# Patient Record
Sex: MALE | Race: BLACK OR AFRICAN AMERICAN | NOT HISPANIC OR LATINO | Employment: FULL TIME | URBAN - METROPOLITAN AREA
[De-identification: names, ages, dates, MRNs, and addresses within clinical notes are randomized per-mention and may not be internally consistent; named-entity substitution may affect disease eponyms.]

---

## 2018-04-05 PROCEDURE — 87070 CULTURE OTHR SPECIMN AEROBIC: CPT | Performed by: FAMILY MEDICINE

## 2018-04-05 PROCEDURE — 87147 CULTURE TYPE IMMUNOLOGIC: CPT | Performed by: FAMILY MEDICINE

## 2018-04-05 PROCEDURE — 87205 SMEAR GRAM STAIN: CPT | Performed by: FAMILY MEDICINE

## 2018-04-09 ENCOUNTER — TELEPHONE (OUTPATIENT)
Dept: URGENT CARE | Facility: CLINIC | Age: 28
End: 2018-04-09

## 2019-06-15 PROBLEM — Z20.2 STD EXPOSURE: Status: ACTIVE | Noted: 2019-06-15

## 2019-06-15 PROCEDURE — 87491 CHLMYD TRACH DNA AMP PROBE: CPT | Performed by: NURSE PRACTITIONER

## 2019-06-15 PROCEDURE — 87591 N.GONORRHOEAE DNA AMP PROB: CPT | Performed by: NURSE PRACTITIONER

## 2019-06-15 PROCEDURE — 87661 TRICHOMONAS VAGINALIS AMPLIF: CPT | Performed by: NURSE PRACTITIONER

## 2019-06-19 ENCOUNTER — TELEPHONE (OUTPATIENT)
Dept: URGENT CARE | Facility: CLINIC | Age: 29
End: 2019-06-19

## 2019-11-13 ENCOUNTER — HOSPITAL ENCOUNTER (EMERGENCY)
Facility: HOSPITAL | Age: 29
Discharge: HOME | End: 2019-11-13
Attending: EMERGENCY MEDICINE | Admitting: EMERGENCY MEDICINE

## 2019-11-13 VITALS
HEART RATE: 63 BPM | OXYGEN SATURATION: 100 % | RESPIRATION RATE: 16 BRPM | WEIGHT: 171.6 LBS | BODY MASS INDEX: 25.42 KG/M2 | SYSTOLIC BLOOD PRESSURE: 125 MMHG | HEIGHT: 69 IN | DIASTOLIC BLOOD PRESSURE: 86 MMHG | TEMPERATURE: 98.3 F

## 2019-11-13 DIAGNOSIS — I88.9 LYMPHADENITIS: Primary | ICD-10-CM

## 2019-11-13 PROCEDURE — 63710000001 PREDNISONE PER 1 MG: Performed by: PHYSICIAN ASSISTANT

## 2019-11-13 PROCEDURE — 99283 EMERGENCY DEPT VISIT LOW MDM: CPT

## 2019-11-13 RX ORDER — SENNOSIDES 8.6 MG
650 CAPSULE ORAL EVERY 8 HOURS PRN
Qty: 12 TABLET | Refills: 0 | Status: SHIPPED | OUTPATIENT
Start: 2019-11-13 | End: 2021-10-15

## 2019-11-13 RX ORDER — ONDANSETRON 4 MG/1
4 TABLET, ORALLY DISINTEGRATING ORAL ONCE
Status: COMPLETED | OUTPATIENT
Start: 2019-11-13 | End: 2019-11-13

## 2019-11-13 RX ORDER — AMOXICILLIN 500 MG/1
500 CAPSULE ORAL ONCE
Status: COMPLETED | OUTPATIENT
Start: 2019-11-13 | End: 2019-11-13

## 2019-11-13 RX ORDER — AMOXICILLIN 500 MG/1
500 CAPSULE ORAL 3 TIMES DAILY
Qty: 21 CAPSULE | Refills: 0 | Status: SHIPPED | OUTPATIENT
Start: 2019-11-13 | End: 2019-11-20

## 2019-11-13 RX ORDER — PREDNISONE 20 MG/1
20 TABLET ORAL 2 TIMES DAILY
Qty: 8 TABLET | Refills: 0 | Status: SHIPPED | OUTPATIENT
Start: 2019-11-13 | End: 2021-10-15

## 2019-11-13 RX ORDER — ACETAMINOPHEN 325 MG/1
650 TABLET ORAL ONCE
Status: COMPLETED | OUTPATIENT
Start: 2019-11-13 | End: 2019-11-13

## 2019-11-13 RX ORDER — PREDNISONE 20 MG/1
40 TABLET ORAL ONCE
Status: COMPLETED | OUTPATIENT
Start: 2019-11-13 | End: 2019-11-13

## 2019-11-13 RX ORDER — HYDROCODONE BITARTRATE AND ACETAMINOPHEN 5; 325 MG/1; MG/1
1 TABLET ORAL ONCE
Status: COMPLETED | OUTPATIENT
Start: 2019-11-13 | End: 2019-11-13

## 2019-11-13 RX ADMIN — HYDROCODONE BITARTRATE AND ACETAMINOPHEN 1 TABLET: 5; 325 TABLET ORAL at 18:44

## 2019-11-13 RX ADMIN — ACETAMINOPHEN 650 MG: 325 TABLET, FILM COATED ORAL at 18:28

## 2019-11-13 RX ADMIN — PREDNISONE 40 MG: 20 TABLET ORAL at 18:28

## 2019-11-13 RX ADMIN — AMOXICILLIN 500 MG: 500 CAPSULE ORAL at 18:28

## 2019-11-13 RX ADMIN — ONDANSETRON 4 MG: 4 TABLET, ORALLY DISINTEGRATING ORAL at 18:44

## 2019-11-13 NOTE — ED PROVIDER NOTES
Subjective   Patient is here with complaint of some sore throat symptoms for the past week also having some pain in the right side of the neck and in his right ear for the past few days is drinking fluids, but having more pain on the right neck prompting him to come here for evaluation no fevers chills reported no chest pain shortness of air reported no other systemic complaints no trauma reported        History provided by:  Patient      Review of Systems   Constitutional: Negative.  Negative for fever.   HENT: Positive for congestion and sore throat.    Eyes: Negative.    Respiratory: Negative.    Cardiovascular: Negative.    Gastrointestinal: Negative.    Musculoskeletal: Positive for neck pain. Negative for neck stiffness.   Skin: Negative.    Neurological: Negative.    Psychiatric/Behavioral: Negative.    All other systems reviewed and are negative.      Past Medical History:   Diagnosis Date   • Asthma    • Asthma    • Hypertension    • Migraines        No Known Allergies    Past Surgical History:   Procedure Laterality Date   • DENTAL PROCEDURE     • HERNIA REPAIR         Family History   Adopted: Yes       Social History     Socioeconomic History   • Marital status: Single     Spouse name: Not on file   • Number of children: Not on file   • Years of education: Not on file   • Highest education level: Not on file   Tobacco Use   • Smoking status: Current Every Day Smoker     Packs/day: 0.50     Types: Cigarettes   Substance and Sexual Activity   • Alcohol use: No   • Drug use: No   • Sexual activity: Yes     Partners: Female           Objective   Physical Exam   Constitutional: He is oriented to person, place, and time. He appears well-developed and well-nourished. No distress.   Afebrile vital signs stable nontoxic no acute distress ambulatory   HENT:   Head: Normocephalic and atraumatic.   Right Ear: External ear normal.   Left Ear: External ear normal.   Mouth/Throat: Oropharynx is clear and moist. No  oropharyngeal exudate.   Posterior pharynx is clear uvula midline no airway compromise   Eyes: Conjunctivae and EOM are normal. Pupils are equal, round, and reactive to light.   Neck: Neck supple. No tracheal deviation present.   Noted mild palpable right anterior cervical lymphadenopathy there is no fluctuance or edema or skin discoloration   Cardiovascular: Normal rate and regular rhythm.   Pulmonary/Chest: Effort normal and breath sounds normal. No respiratory distress.   Abdominal: Soft. He exhibits no distension.   Musculoskeletal: Normal range of motion.   Lymphadenopathy:     He has cervical adenopathy.   Neurological: He is alert and oriented to person, place, and time. No cranial nerve deficit or sensory deficit. He exhibits normal muscle tone. Coordination normal.   Skin: Skin is warm and dry. Capillary refill takes less than 2 seconds. No rash noted. He is not diaphoretic. No erythema. No pallor.   Psychiatric: He has a normal mood and affect. His behavior is normal. Judgment and thought content normal.   Nursing note and vitals reviewed.      Procedures           ED Course  ED Course as of Nov 13 1839   Wed Nov 13, 2019 1837 We will plan on treating for lymphadenitis prescription pain medication antibiotics, recommend follow-up with PCP by Friday or the ER if there is any problem with follow-up any worsening symptom concerns despite the medication he is agreeable with plan  Work excuse for today and tomorrow  [SC]      ED Course User Index  [SC] Jc Butler, BRANDON                  MDM  Number of Diagnoses or Management Options     Amount and/or Complexity of Data Reviewed  Obtain history from someone other than the patient: yes  Review and summarize past medical records: yes  Discuss the patient with other providers: yes    Risk of Complications, Morbidity, and/or Mortality  Presenting problems: moderate  Diagnostic procedures: low  Management options: low        Final diagnoses:    Lymphadenitis              Jc Butler, BRANDON  11/13/19 1836

## 2020-02-09 ENCOUNTER — HOSPITAL ENCOUNTER (EMERGENCY)
Facility: HOSPITAL | Age: 30
Discharge: LEFT BEFORE DISCHARGE | End: 2020-02-09
Attending: STUDENT IN AN ORGANIZED HEALTH CARE EDUCATION/TRAINING PROGRAM

## 2020-02-09 VITALS
DIASTOLIC BLOOD PRESSURE: 100 MMHG | OXYGEN SATURATION: 97 % | HEIGHT: 69 IN | RESPIRATION RATE: 18 BRPM | HEART RATE: 59 BPM | SYSTOLIC BLOOD PRESSURE: 158 MMHG | WEIGHT: 168.2 LBS | BODY MASS INDEX: 24.91 KG/M2 | TEMPERATURE: 97.4 F

## 2020-02-10 ENCOUNTER — HOSPITAL ENCOUNTER (EMERGENCY)
Facility: HOSPITAL | Age: 30
Discharge: HOME | End: 2020-02-10
Attending: EMERGENCY MEDICINE | Admitting: EMERGENCY MEDICINE

## 2020-02-10 ENCOUNTER — APPOINTMENT (OUTPATIENT)
Dept: GENERAL RADIOLOGY | Facility: HOSPITAL | Age: 30
End: 2020-02-10

## 2020-02-10 VITALS
HEIGHT: 70 IN | OXYGEN SATURATION: 99 % | RESPIRATION RATE: 16 BRPM | WEIGHT: 167.6 LBS | BODY MASS INDEX: 23.99 KG/M2 | HEART RATE: 50 BPM | DIASTOLIC BLOOD PRESSURE: 91 MMHG | SYSTOLIC BLOOD PRESSURE: 133 MMHG | TEMPERATURE: 97.7 F

## 2020-02-10 DIAGNOSIS — M79.672 LEFT FOOT PAIN: Primary | ICD-10-CM

## 2020-02-10 PROCEDURE — 73630 X-RAY EXAM OF FOOT: CPT

## 2020-02-10 PROCEDURE — 99283 EMERGENCY DEPT VISIT LOW MDM: CPT

## 2020-02-10 NOTE — ED PROVIDER NOTES
Subjective   History of Present Illness    Chief Complaint: Left foot pain  History of Present Illness: Left foot pain along the left lateral aspect of the foot near the proximal aspect of the fifth metatarsal.  States he has had no trauma or injury.  Has had bony protuberance from both feet in that area for some time developed a callus over time as well and then file the callus down but still has pain.  Onset: Ongoing issue  Duration: Persistent  Exacerbating / Alleviating factors: Worse with weightbearing  Associated symptoms: None      Nurses Notes reviewed and agree, including vitals, allergies, social history and prior medical history.     REVIEW OF SYSTEMS: All systems reviewed and not pertinent unless noted.    Positive for: Left foot pain    Negative for: Fall or trauma  Review of Systems    Past Medical History:   Diagnosis Date   • Asthma    • Asthma    • Hypertension    • Migraines        No Known Allergies    Past Surgical History:   Procedure Laterality Date   • DENTAL PROCEDURE     • HERNIA REPAIR         Family History   Adopted: Yes       Social History     Socioeconomic History   • Marital status: Single     Spouse name: Not on file   • Number of children: Not on file   • Years of education: Not on file   • Highest education level: Not on file   Tobacco Use   • Smoking status: Current Every Day Smoker     Packs/day: 0.50     Types: Cigarettes   Substance and Sexual Activity   • Alcohol use: No   • Drug use: No   • Sexual activity: Yes     Partners: Female           Objective   Physical Exam  GENERAL APPEARANCE: Well developed, well nourished, in no acute distress.  VITAL SIGNS: per nursing, reviewed and noted  SKIN: no rashes, ulcerations or petechiae.  A recently found callus without underlying ulceration or cellulitis to the proximal fifth metatarsal.  Left foot.  Head: Normocephalic, atraumatic.   EYES:  EOMI.  LUNGS: No increased work of breathing. No retractions.   CARDIOVASCULAR:  regular rate  and rhythm, no murmurs.  Good Peripheral pulses. Good capillary refill.   MUSCULOSKELETAL: No compartment syndrome. Intact sensation tenderness palpation on the left proximal fifth metatarsal area.  Similar findings to the right foot without tenderness.  Minimal soft tissue swelling affected area left foot  NEUROLOGIC: Alert, oriented x 3. No gross deficits.   NECK: Supple, symmetric. No tenderness, Full ROM  Back: full rom, no paraspinal spasm.     Procedures     No attending provider procedures were performed      ED Course  ED Course as of Feb 10 1117   Mon Feb 10, 2020   1034 History: pain and swelling,  prox 5th metatarsal area.     COMPARISON: None.     FINDINGS:  A 3 view exam demonstrates no acute fracture or dislocation.  The joint spaces are preserved. No soft tissue abnormality is seen.     IMPRESSION:  No acute fracture.     [PF]      ED Course User Index  [PF] Alberto Westfall,                                                MDM  No evidence of fracture dislocation, will do postop shoe, advised outpatient orthopedic follow-up consider orthotics and wider soled shoes with adequate insoles.  Advised supportive care with over-the-counter anti-inflammatories.  Return precautions discussed.  Final diagnoses:   Left foot pain            Alberto Westfall,   02/10/20 1117

## 2021-05-17 ENCOUNTER — HOSPITAL ENCOUNTER (EMERGENCY)
Facility: HOSPITAL | Age: 31
Discharge: HOME | End: 2021-05-18
Attending: EMERGENCY MEDICINE | Admitting: EMERGENCY MEDICINE

## 2021-05-17 ENCOUNTER — APPOINTMENT (OUTPATIENT)
Dept: CT IMAGING | Facility: HOSPITAL | Age: 31
End: 2021-05-17

## 2021-05-17 DIAGNOSIS — M54.16 LUMBAR BACK PAIN WITH RADICULOPATHY AFFECTING RIGHT LOWER EXTREMITY: Primary | ICD-10-CM

## 2021-05-17 DIAGNOSIS — M51.27 HERNIATION OF INTERVERTEBRAL DISC BETWEEN L5 AND S1: ICD-10-CM

## 2021-05-17 LAB
ALBUMIN SERPL-MCNC: 4 G/DL (ref 3.5–5.2)
ALBUMIN/GLOB SERPL: 1.5 G/DL
ALP SERPL-CCNC: 77 U/L (ref 39–117)
ALT SERPL W P-5'-P-CCNC: 24 U/L (ref 1–41)
ANION GAP SERPL CALCULATED.3IONS-SCNC: 8 MMOL/L (ref 5–15)
AST SERPL-CCNC: 16 U/L (ref 1–40)
BASOPHILS # BLD AUTO: 0.04 10*3/MM3 (ref 0–0.2)
BASOPHILS NFR BLD AUTO: 0.2 % (ref 0–1.5)
BILIRUB SERPL-MCNC: 0.3 MG/DL (ref 0–1.2)
BUN SERPL-MCNC: 12 MG/DL (ref 6–20)
BUN/CREAT SERPL: 14.6 (ref 7–25)
CALCIUM SPEC-SCNC: 9.3 MG/DL (ref 8.6–10.5)
CHLORIDE SERPL-SCNC: 102 MMOL/L (ref 98–107)
CO2 SERPL-SCNC: 26 MMOL/L (ref 22–29)
CREAT SERPL-MCNC: 0.82 MG/DL (ref 0.76–1.27)
DEPRECATED RDW RBC AUTO: 41.9 FL (ref 37–54)
EOSINOPHIL # BLD AUTO: 0.01 10*3/MM3 (ref 0–0.4)
EOSINOPHIL NFR BLD AUTO: 0.1 % (ref 0.3–6.2)
ERYTHROCYTE [DISTWIDTH] IN BLOOD BY AUTOMATED COUNT: 12.9 % (ref 12.3–15.4)
GFR SERPL CREATININE-BSD FRML MDRD: 133 ML/MIN/1.73
GLOBULIN UR ELPH-MCNC: 2.7 GM/DL
GLUCOSE SERPL-MCNC: 127 MG/DL (ref 65–99)
HCT VFR BLD AUTO: 45 % (ref 37.5–51)
HGB BLD-MCNC: 15.3 G/DL (ref 13–17.7)
IMM GRANULOCYTES # BLD AUTO: 0.11 10*3/MM3 (ref 0–0.05)
IMM GRANULOCYTES NFR BLD AUTO: 0.7 % (ref 0–0.5)
LYMPHOCYTES # BLD AUTO: 1.55 10*3/MM3 (ref 0.7–3.1)
LYMPHOCYTES NFR BLD AUTO: 9.6 % (ref 19.6–45.3)
MCH RBC QN AUTO: 30.1 PG (ref 26.6–33)
MCHC RBC AUTO-ENTMCNC: 34 G/DL (ref 31.5–35.7)
MCV RBC AUTO: 88.4 FL (ref 79–97)
MONOCYTES # BLD AUTO: 0.89 10*3/MM3 (ref 0.1–0.9)
MONOCYTES NFR BLD AUTO: 5.5 % (ref 5–12)
NEUTROPHILS NFR BLD AUTO: 13.58 10*3/MM3 (ref 1.7–7)
NEUTROPHILS NFR BLD AUTO: 83.9 % (ref 42.7–76)
NRBC BLD AUTO-RTO: 0 /100 WBC (ref 0–0.2)
PLATELET # BLD AUTO: 153 10*3/MM3 (ref 140–450)
PMV BLD AUTO: 12.3 FL (ref 6–12)
POTASSIUM SERPL-SCNC: 4.2 MMOL/L (ref 3.5–5.2)
PROT SERPL-MCNC: 6.7 G/DL (ref 6–8.5)
RBC # BLD AUTO: 5.09 10*6/MM3 (ref 4.14–5.8)
SODIUM SERPL-SCNC: 136 MMOL/L (ref 136–145)
WBC # BLD AUTO: 16.18 10*3/MM3 (ref 3.4–10.8)

## 2021-05-17 PROCEDURE — 96374 THER/PROPH/DIAG INJ IV PUSH: CPT

## 2021-05-17 PROCEDURE — 25010000002 KETOROLAC TROMETHAMINE PER 15 MG: Performed by: PHYSICIAN ASSISTANT

## 2021-05-17 PROCEDURE — 99284 EMERGENCY DEPT VISIT MOD MDM: CPT

## 2021-05-17 PROCEDURE — 72131 CT LUMBAR SPINE W/O DYE: CPT

## 2021-05-17 PROCEDURE — 25010000002 HYDROMORPHONE PER 4 MG: Performed by: EMERGENCY MEDICINE

## 2021-05-17 PROCEDURE — 85025 COMPLETE CBC W/AUTO DIFF WBC: CPT | Performed by: PHYSICIAN ASSISTANT

## 2021-05-17 PROCEDURE — 96375 TX/PRO/DX INJ NEW DRUG ADDON: CPT

## 2021-05-17 PROCEDURE — 80053 COMPREHEN METABOLIC PANEL: CPT | Performed by: PHYSICIAN ASSISTANT

## 2021-05-17 RX ORDER — HYDROMORPHONE HYDROCHLORIDE 1 MG/ML
0.5 INJECTION, SOLUTION INTRAMUSCULAR; INTRAVENOUS; SUBCUTANEOUS ONCE
Status: COMPLETED | OUTPATIENT
Start: 2021-05-17 | End: 2021-05-17

## 2021-05-17 RX ORDER — KETOROLAC TROMETHAMINE 15 MG/ML
15 INJECTION, SOLUTION INTRAMUSCULAR; INTRAVENOUS ONCE
Status: COMPLETED | OUTPATIENT
Start: 2021-05-17 | End: 2021-05-17

## 2021-05-17 RX ADMIN — HYDROMORPHONE HYDROCHLORIDE 0.5 MG: 1 INJECTION, SOLUTION INTRAMUSCULAR; INTRAVENOUS; SUBCUTANEOUS at 21:58

## 2021-05-17 RX ADMIN — KETOROLAC TROMETHAMINE 15 MG: 15 INJECTION, SOLUTION INTRAMUSCULAR; INTRAVENOUS at 21:58

## 2021-05-18 VITALS
WEIGHT: 175 LBS | SYSTOLIC BLOOD PRESSURE: 175 MMHG | TEMPERATURE: 98 F | DIASTOLIC BLOOD PRESSURE: 110 MMHG | BODY MASS INDEX: 25.05 KG/M2 | OXYGEN SATURATION: 97 % | RESPIRATION RATE: 16 BRPM | HEART RATE: 84 BPM | HEIGHT: 70 IN

## 2021-05-18 LAB
BACTERIA UR QL AUTO: NORMAL /HPF
BILIRUB UR QL STRIP: NEGATIVE
CLARITY UR: CLEAR
COLOR UR: YELLOW
GLUCOSE UR STRIP-MCNC: ABNORMAL MG/DL
HGB UR QL STRIP.AUTO: NEGATIVE
HYALINE CASTS UR QL AUTO: NORMAL /LPF
KETONES UR QL STRIP: NEGATIVE
LEUKOCYTE ESTERASE UR QL STRIP.AUTO: NEGATIVE
NITRITE UR QL STRIP: NEGATIVE
PH UR STRIP.AUTO: 7.5 [PH] (ref 5–8)
PROT UR QL STRIP: ABNORMAL
RBC # UR: NORMAL /HPF
REF LAB TEST METHOD: NORMAL
SP GR UR STRIP: 1.02 (ref 1–1.03)
SQUAMOUS #/AREA URNS HPF: NORMAL /HPF
TRANS CELLS #/AREA URNS HPF: NORMAL /HPF
UROBILINOGEN UR QL STRIP: ABNORMAL
WBC UR QL AUTO: NORMAL /HPF

## 2021-05-18 PROCEDURE — 25010000002 HYDRALAZINE PER 20 MG: Performed by: PHYSICIAN ASSISTANT

## 2021-05-18 PROCEDURE — 25010000002 HYDROMORPHONE PER 4 MG: Performed by: EMERGENCY MEDICINE

## 2021-05-18 PROCEDURE — 96375 TX/PRO/DX INJ NEW DRUG ADDON: CPT

## 2021-05-18 PROCEDURE — 81001 URINALYSIS AUTO W/SCOPE: CPT | Performed by: PHYSICIAN ASSISTANT

## 2021-05-18 PROCEDURE — 96376 TX/PRO/DX INJ SAME DRUG ADON: CPT

## 2021-05-18 RX ORDER — HYDRALAZINE HYDROCHLORIDE 20 MG/ML
10 INJECTION INTRAMUSCULAR; INTRAVENOUS ONCE
Status: COMPLETED | OUTPATIENT
Start: 2021-05-18 | End: 2021-05-18

## 2021-05-18 RX ORDER — HYDROMORPHONE HYDROCHLORIDE 1 MG/ML
0.5 INJECTION, SOLUTION INTRAMUSCULAR; INTRAVENOUS; SUBCUTANEOUS ONCE
Status: COMPLETED | OUTPATIENT
Start: 2021-05-18 | End: 2021-05-18

## 2021-05-18 RX ORDER — HYDROCODONE BITARTRATE AND ACETAMINOPHEN 7.5; 325 MG/1; MG/1
1 TABLET ORAL EVERY 6 HOURS PRN
Qty: 12 TABLET | Refills: 0 | Status: SHIPPED | OUTPATIENT
Start: 2021-05-18 | End: 2021-05-21

## 2021-05-18 RX ADMIN — HYDROMORPHONE HYDROCHLORIDE 0.5 MG: 1 INJECTION, SOLUTION INTRAMUSCULAR; INTRAVENOUS; SUBCUTANEOUS at 00:29

## 2021-05-18 RX ADMIN — HYDRALAZINE HYDROCHLORIDE 10 MG: 20 INJECTION INTRAMUSCULAR; INTRAVENOUS at 00:42

## 2021-05-19 ENCOUNTER — OFFICE VISIT (OUTPATIENT)
Dept: NEUROSURGERY | Facility: CLINIC | Age: 31
End: 2021-05-19

## 2021-05-19 VITALS
WEIGHT: 180.6 LBS | SYSTOLIC BLOOD PRESSURE: 120 MMHG | HEIGHT: 70 IN | DIASTOLIC BLOOD PRESSURE: 80 MMHG | BODY MASS INDEX: 25.86 KG/M2 | TEMPERATURE: 97.1 F

## 2021-05-19 DIAGNOSIS — Z71.6 ENCOUNTER FOR SMOKING CESSATION COUNSELING: ICD-10-CM

## 2021-05-19 DIAGNOSIS — M54.16 LUMBAR RADICULOPATHY: ICD-10-CM

## 2021-05-19 DIAGNOSIS — M51.36 DISC DEGENERATION, LUMBAR: Primary | ICD-10-CM

## 2021-05-19 PROCEDURE — 99204 OFFICE O/P NEW MOD 45 MIN: CPT | Performed by: PHYSICIAN ASSISTANT

## 2021-05-19 RX ORDER — NAPROXEN 500 MG/1
TABLET ORAL
COMMUNITY
Start: 2021-05-16 | End: 2021-10-15

## 2021-05-19 RX ORDER — CYCLOBENZAPRINE HCL 10 MG
TABLET ORAL
COMMUNITY
Start: 2021-05-16 | End: 2021-10-15

## 2021-05-19 RX ORDER — PREDNISONE 10 MG/1
TABLET ORAL
COMMUNITY
Start: 2021-05-16 | End: 2021-10-15
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
## 2021-06-15 ENCOUNTER — OFFICE VISIT (OUTPATIENT)
Dept: NEUROSURGERY | Facility: CLINIC | Age: 31
End: 2021-06-15

## 2021-06-15 VITALS
SYSTOLIC BLOOD PRESSURE: 152 MMHG | WEIGHT: 177 LBS | HEIGHT: 70 IN | TEMPERATURE: 97.8 F | DIASTOLIC BLOOD PRESSURE: 90 MMHG | BODY MASS INDEX: 25.34 KG/M2

## 2021-06-15 DIAGNOSIS — M51.36 DISC DEGENERATION, LUMBAR: Primary | ICD-10-CM

## 2021-06-15 DIAGNOSIS — M54.16 LUMBAR RADICULOPATHY: ICD-10-CM

## 2021-06-15 DIAGNOSIS — Z71.6 ENCOUNTER FOR SMOKING CESSATION COUNSELING: ICD-10-CM

## 2021-06-15 PROCEDURE — 99214 OFFICE O/P EST MOD 30 MIN: CPT | Performed by: PHYSICIAN ASSISTANT

## 2021-06-15 RX ORDER — GABAPENTIN 300 MG/1
300 CAPSULE ORAL 3 TIMES DAILY
Qty: 90 CAPSULE | Refills: 0 | Status: SHIPPED | OUTPATIENT
Start: 2021-06-15 | End: 2021-10-15
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
## 2021-07-12 ENCOUNTER — TREATMENT (OUTPATIENT)
Dept: PHYSICAL THERAPY | Facility: CLINIC | Age: 31
End: 2021-07-12

## 2021-07-12 DIAGNOSIS — M54.41 ACUTE RIGHT-SIDED LOW BACK PAIN WITH RIGHT-SIDED SCIATICA: Primary | ICD-10-CM

## 2021-07-12 PROCEDURE — 97162 PT EVAL MOD COMPLEX 30 MIN: CPT | Performed by: PHYSICAL THERAPIST
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
## 2021-07-13 ENCOUNTER — OFFICE VISIT (OUTPATIENT)
Dept: NEUROSURGERY | Facility: CLINIC | Age: 31
End: 2021-07-13

## 2021-07-13 VITALS
DIASTOLIC BLOOD PRESSURE: 76 MMHG | TEMPERATURE: 97.3 F | HEIGHT: 70 IN | HEART RATE: 84 BPM | WEIGHT: 179 LBS | BODY MASS INDEX: 25.62 KG/M2 | SYSTOLIC BLOOD PRESSURE: 128 MMHG | RESPIRATION RATE: 18 BRPM | OXYGEN SATURATION: 98 %

## 2021-07-13 DIAGNOSIS — Z71.6 ENCOUNTER FOR SMOKING CESSATION COUNSELING: ICD-10-CM

## 2021-07-13 DIAGNOSIS — M54.16 LUMBAR RADICULOPATHY: ICD-10-CM

## 2021-07-13 DIAGNOSIS — M51.36 DISC DEGENERATION, LUMBAR: Primary | ICD-10-CM

## 2021-07-13 PROCEDURE — 99213 OFFICE O/P EST LOW 20 MIN: CPT | Performed by: PHYSICIAN ASSISTANT
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
## 2021-07-26 ENCOUNTER — TREATMENT (OUTPATIENT)
Dept: PHYSICAL THERAPY | Facility: CLINIC | Age: 31
End: 2021-07-26

## 2021-07-26 DIAGNOSIS — M54.41 ACUTE RIGHT-SIDED LOW BACK PAIN WITH RIGHT-SIDED SCIATICA: Primary | ICD-10-CM

## 2021-07-26 PROCEDURE — 97140 MANUAL THERAPY 1/> REGIONS: CPT | Performed by: PHYSICAL THERAPIST

## 2021-07-26 PROCEDURE — 97110 THERAPEUTIC EXERCISES: CPT | Performed by: PHYSICAL THERAPIST

## 2021-07-26 PROCEDURE — 97012 MECHANICAL TRACTION THERAPY: CPT | Performed by: PHYSICAL THERAPIST

## 2021-07-26 NOTE — PROGRESS NOTES
Physical Therapy Daily Treatment Note      Patient: Eddie Lindsay   : 1990  Referring practitioner: RODNEY Pool*  Date of Initial Visit: Type: THERAPY  Noted: 2021  Today's Date: 2021  Patient seen for 2 sessions           Subjective Evaluation    History of Present Illness  Mechanism of injury: The pt stated that his LE pain has been about the same since his IE and feels that his back pain has been mildly improved. He has been partially compliant with his repeated motions, reporting performance 2x/day. He feels he gets some relief from back pain with side glides but does not feel it helps the pain in the leg. He remains unable to find any position of comfort for the LE. He saw his neurosurgeon two weeks ago. He was prescribed gabapentin and does not feel it helps. He has been busy at work and feels it is tough to find time to rest and perform rehab activities.    Pain  Current pain ratin  Location: R LE pain; back pain 6/10           Objective   See Exercise, Manual, and Modality Logs for complete treatment.       Assessment & Plan     Assessment  Assessment details: The pt has not been seen in 2 weeks due to scheduling miscommunications. He did not stop to schedule visits with our  after his IE and called central scheduling later. Unfortunately they only have access to evaluation slots and thus he was scheduled further out. He was asked to speak to us directly about any scheduling issues in the future.     He did not seem to benefit from repeated motions in the last 2 weeks although performance was not frequent. These were attempted today with on immediate change in symptoms so other motions and positions were attempted. He responded well today to KTC stretching and had a large reduction in distal symptoms. Several other flexion based positions and exercises were performed with moderate success and were added to his HEP. MT was performed but did not relieve  symptoms. Traction was also trialed and no immediate improvement was seen but he was encouraged to monitor symptoms over the next few days.     Plan  Plan details: Pt to call and schedule earlier appt if traction reduced distal symptoms. Assess response to flexion based exercises.        Visit Diagnoses:    ICD-10-CM ICD-9-CM   1. Acute right-sided low back pain with right-sided sciatica  M54.41 724.2     724.3       Progress per Plan of Care           Timed:  Manual Therapy:    15     mins  65395;  Therapeutic Exercise:    30     mins  31602;     Neuromuscular Marie:    0    mins  84077;    Therapeutic Activity:     0     mins  82090;     Gait Trainin     mins  36935;     Ultrasound:     0     mins  35325;    Electrical Stimulation:    0     mins  56673 ( );    Untimed:  Electrical Stimulation:    0     mins  21628 ( );  Mechanical Traction:    10     mins  60537;     Timed Treatment:   45   mins   Total Treatment:     55   mins  Jorge Luis Lindsay PT  Physical Therapist

## 2021-08-23 ENCOUNTER — DOCUMENTATION (OUTPATIENT)
Dept: PHYSICAL THERAPY | Facility: CLINIC | Age: 31
End: 2021-08-23

## 2021-08-23 DIAGNOSIS — M54.41 ACUTE RIGHT-SIDED LOW BACK PAIN WITH RIGHT-SIDED SCIATICA: Primary | ICD-10-CM

## 2021-08-23 NOTE — PROGRESS NOTES
Discharge Summary  Discharge Summary from Physical Therapy Report      Date of initial PT visit: 7/12/21    Number of Visits: 2     Goals: Not Met    Discharge Plan: Continue with current home exercise program as instructed  Future need for rehabilitation activities    Comments: The pt was evaluated for LBP and returned for 1 visit but will be d/c after a series of consecutive NCNS.     Date of Discharge: 8/23/21        Jorge Luis Lindsay PT  Physical Therapist

## 2021-10-05 ENCOUNTER — OFFICE VISIT (OUTPATIENT)
Dept: NEUROSURGERY | Facility: CLINIC | Age: 31
End: 2021-10-05

## 2021-10-05 VITALS
BODY MASS INDEX: 26.48 KG/M2 | TEMPERATURE: 97.3 F | SYSTOLIC BLOOD PRESSURE: 106 MMHG | HEIGHT: 70 IN | DIASTOLIC BLOOD PRESSURE: 82 MMHG | WEIGHT: 185 LBS

## 2021-10-05 DIAGNOSIS — M54.16 LUMBAR RADICULOPATHY: ICD-10-CM

## 2021-10-05 DIAGNOSIS — M51.36 DISC DEGENERATION, LUMBAR: Primary | ICD-10-CM

## 2021-10-05 DIAGNOSIS — Z71.6 ENCOUNTER FOR SMOKING CESSATION COUNSELING: ICD-10-CM

## 2021-10-05 PROCEDURE — 99214 OFFICE O/P EST MOD 30 MIN: CPT | Performed by: PHYSICIAN ASSISTANT

## 2021-10-05 RX ORDER — HYDROCHLOROTHIAZIDE 50 MG/1
12.5 TABLET ORAL DAILY
COMMUNITY
End: 2023-03-28 | Stop reason: SDUPTHER

## 2021-10-05 RX ORDER — SODIUM CHLORIDE 9 MG/ML
100 INJECTION, SOLUTION INTRAVENOUS CONTINUOUS
Status: CANCELLED | OUTPATIENT
Start: 2021-10-05

## 2021-10-05 RX ORDER — FAMOTIDINE 10 MG
20 TABLET ORAL
Status: CANCELLED | OUTPATIENT
Start: 2021-10-05
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
## 2021-10-13 PROBLEM — M54.16 LUMBAR RADICULOPATHY: Status: ACTIVE | Noted: 2021-10-13

## 2021-10-19 RX ORDER — CHLORHEXIDINE GLUCONATE 4 G/100ML
SOLUTION TOPICAL DAILY
Qty: 120 ML | Refills: 0 | Status: SHIPPED | OUTPATIENT
Start: 2021-10-19 | End: 2021-11-11 | Stop reason: HOSPADM

## 2021-11-09 ENCOUNTER — PRE-ADMISSION TESTING (OUTPATIENT)
Dept: PREADMISSION TESTING | Facility: HOSPITAL | Age: 31
End: 2021-11-09

## 2021-11-09 VITALS — HEIGHT: 70 IN | WEIGHT: 194.89 LBS | BODY MASS INDEX: 27.9 KG/M2

## 2021-11-09 DIAGNOSIS — M54.16 LUMBAR RADICULOPATHY: ICD-10-CM

## 2021-11-09 LAB
ALBUMIN SERPL-MCNC: 4.1 G/DL (ref 3.5–5.2)
ALBUMIN/GLOB SERPL: 1.6 G/DL
ALP SERPL-CCNC: 101 U/L (ref 39–117)
ALT SERPL W P-5'-P-CCNC: 30 U/L (ref 1–41)
ANION GAP SERPL CALCULATED.3IONS-SCNC: 6 MMOL/L (ref 5–15)
AST SERPL-CCNC: 22 U/L (ref 1–40)
BASOPHILS # BLD AUTO: 0.04 10*3/MM3 (ref 0–0.2)
BASOPHILS NFR BLD AUTO: 0.6 % (ref 0–1.5)
BILIRUB SERPL-MCNC: 0.4 MG/DL (ref 0–1.2)
BUN SERPL-MCNC: 7 MG/DL (ref 6–20)
BUN/CREAT SERPL: 9 (ref 7–25)
CALCIUM SPEC-SCNC: 9.1 MG/DL (ref 8.6–10.5)
CHLORIDE SERPL-SCNC: 104 MMOL/L (ref 98–107)
CO2 SERPL-SCNC: 28 MMOL/L (ref 22–29)
CREAT SERPL-MCNC: 0.78 MG/DL (ref 0.76–1.27)
DEPRECATED RDW RBC AUTO: 40 FL (ref 37–54)
EOSINOPHIL # BLD AUTO: 0.24 10*3/MM3 (ref 0–0.4)
EOSINOPHIL NFR BLD AUTO: 3.9 % (ref 0.3–6.2)
ERYTHROCYTE [DISTWIDTH] IN BLOOD BY AUTOMATED COUNT: 12.9 % (ref 12.3–15.4)
GFR SERPL CREATININE-BSD FRML MDRD: 140 ML/MIN/1.73
GLOBULIN UR ELPH-MCNC: 2.5 GM/DL
GLUCOSE SERPL-MCNC: 101 MG/DL (ref 65–99)
HCT VFR BLD AUTO: 44.2 % (ref 37.5–51)
HGB BLD-MCNC: 15.4 G/DL (ref 13–17.7)
IMM GRANULOCYTES # BLD AUTO: 0.02 10*3/MM3 (ref 0–0.05)
IMM GRANULOCYTES NFR BLD AUTO: 0.3 % (ref 0–0.5)
LYMPHOCYTES # BLD AUTO: 1.96 10*3/MM3 (ref 0.7–3.1)
LYMPHOCYTES NFR BLD AUTO: 31.8 % (ref 19.6–45.3)
MCH RBC QN AUTO: 30 PG (ref 26.6–33)
MCHC RBC AUTO-ENTMCNC: 34.8 G/DL (ref 31.5–35.7)
MCV RBC AUTO: 86 FL (ref 79–97)
MONOCYTES # BLD AUTO: 0.63 10*3/MM3 (ref 0.1–0.9)
MONOCYTES NFR BLD AUTO: 10.2 % (ref 5–12)
MRSA DNA SPEC QL NAA+PROBE: NEGATIVE
NEUTROPHILS NFR BLD AUTO: 3.27 10*3/MM3 (ref 1.7–7)
NEUTROPHILS NFR BLD AUTO: 53.2 % (ref 42.7–76)
NRBC BLD AUTO-RTO: 0 /100 WBC (ref 0–0.2)
PLATELET # BLD AUTO: 148 10*3/MM3 (ref 140–450)
PMV BLD AUTO: 11.4 FL (ref 6–12)
POTASSIUM SERPL-SCNC: 4.1 MMOL/L (ref 3.5–5.2)
PROT SERPL-MCNC: 6.6 G/DL (ref 6–8.5)
QT INTERVAL: 396 MS
QTC INTERVAL: 415 MS
RBC # BLD AUTO: 5.14 10*6/MM3 (ref 4.14–5.8)
SARS-COV-2 RNA PNL SPEC NAA+PROBE: NOT DETECTED
SODIUM SERPL-SCNC: 138 MMOL/L (ref 136–145)
WBC # BLD AUTO: 6.16 10*3/MM3 (ref 3.4–10.8)

## 2021-11-09 PROCEDURE — C9803 HOPD COVID-19 SPEC COLLECT: HCPCS

## 2021-11-09 PROCEDURE — 80053 COMPREHEN METABOLIC PANEL: CPT

## 2021-11-09 PROCEDURE — 36415 COLL VENOUS BLD VENIPUNCTURE: CPT

## 2021-11-09 PROCEDURE — 85025 COMPLETE CBC W/AUTO DIFF WBC: CPT

## 2021-11-09 PROCEDURE — 93010 ELECTROCARDIOGRAM REPORT: CPT | Performed by: INTERNAL MEDICINE

## 2021-11-09 PROCEDURE — U0004 COV-19 TEST NON-CDC HGH THRU: HCPCS

## 2021-11-09 PROCEDURE — 87641 MR-STAPH DNA AMP PROBE: CPT

## 2021-11-09 PROCEDURE — 93005 ELECTROCARDIOGRAM TRACING: CPT

## 2021-11-09 NOTE — PAT
Patient did not review general PAT education video as instructed in their preoperative information received from their surgeon.  One-on-one Pre Admission Testing general education provided during PAT visit.  Copies of PAT general education handouts (Incentive Spirometry, Meds to Beds Program, Patient Belongings, Pre-op skin preparation instructions, Blood Glucose testing, Visitor policy, Surgery FAQ, Code H) distributed to patient. Encouraged patient/family to read PAT general education handouts thoroughly and notify PAT staff with any questions or concerns. Patient instructed to bring PAT pass and completed skin prep sheet (if applicable) on the day of procedure. Patient verbalized understanding of all information and priority content.     Patient to apply Chlorhexadine wipes  to surgical area (as instructed) the night before procedure and the AM of procedure. Wipes provided.    Patient instructed to drink 20 ounces (or until full) of Gatorade and it needs to be completed 1 hour (for Main OR patients) or 2 hours (scheduled  section patients) before given arrival time for procedure (NO RED Gatorade)    Patient verbalized understanding.    Bactroban and Chlorhexidine Prescription prescribed by physician before PAT visit.  Verified with patient that medications were picked up from their pharmacy.  Written instructions given to patient during PAT visit.  Patient/family also instructed to complete skin prep checklist and return the checklist on the day of surgery to preoperative staff.  Patient/family verbalized understanding.    EKG in chart.

## 2021-11-11 ENCOUNTER — HOSPITAL ENCOUNTER (OUTPATIENT)
Facility: HOSPITAL | Age: 31
Setting detail: HOSPITAL OUTPATIENT SURGERY
Discharge: HOME | End: 2021-11-11
Attending: NEUROLOGICAL SURGERY | Admitting: NEUROLOGICAL SURGERY

## 2021-11-11 ENCOUNTER — APPOINTMENT (OUTPATIENT)
Dept: GENERAL RADIOLOGY | Facility: HOSPITAL | Age: 31
End: 2021-11-11

## 2021-11-11 ENCOUNTER — ANESTHESIA EVENT (OUTPATIENT)
Dept: PERIOP | Facility: HOSPITAL | Age: 31
End: 2021-11-11

## 2021-11-11 ENCOUNTER — ANESTHESIA (OUTPATIENT)
Dept: PERIOP | Facility: HOSPITAL | Age: 31
End: 2021-11-11

## 2021-11-11 VITALS
RESPIRATION RATE: 14 BRPM | HEART RATE: 68 BPM | TEMPERATURE: 97 F | SYSTOLIC BLOOD PRESSURE: 129 MMHG | OXYGEN SATURATION: 94 % | DIASTOLIC BLOOD PRESSURE: 74 MMHG

## 2021-11-11 DIAGNOSIS — M54.16 LUMBAR RADICULOPATHY: ICD-10-CM

## 2021-11-11 PROCEDURE — C1889 IMPLANT/INSERT DEVICE, NOC: HCPCS | Performed by: NEUROLOGICAL SURGERY

## 2021-11-11 PROCEDURE — 25010000002 FENTANYL CITRATE (PF) 50 MCG/ML SOLUTION

## 2021-11-11 PROCEDURE — 25010000002 DEXAMETHASONE PER 1 MG: Performed by: NURSE ANESTHETIST, CERTIFIED REGISTERED

## 2021-11-11 PROCEDURE — 76000 FLUOROSCOPY <1 HR PHYS/QHP: CPT

## 2021-11-11 PROCEDURE — 25010000002 ONDANSETRON PER 1 MG: Performed by: NURSE ANESTHETIST, CERTIFIED REGISTERED

## 2021-11-11 PROCEDURE — 25010000002 HYDROMORPHONE 1 MG/ML SOLUTION

## 2021-11-11 PROCEDURE — 25010000002 PROPOFOL 10 MG/ML EMULSION: Performed by: NURSE ANESTHETIST, CERTIFIED REGISTERED

## 2021-11-11 PROCEDURE — 63056 DECOMPRESS SPINAL CORD LMBR: CPT | Performed by: PHYSICIAN ASSISTANT

## 2021-11-11 PROCEDURE — 25010000002 METHYLPREDNISOLONE PER 40 MG: Performed by: NEUROLOGICAL SURGERY

## 2021-11-11 PROCEDURE — 63056 DECOMPRESS SPINAL CORD LMBR: CPT | Performed by: NEUROLOGICAL SURGERY

## 2021-11-11 PROCEDURE — 25010000002 FENTANYL CITRATE (PF) 50 MCG/ML SOLUTION: Performed by: NURSE ANESTHETIST, CERTIFIED REGISTERED

## 2021-11-11 PROCEDURE — 25010000002 NEOSTIGMINE 10 MG/10ML SOLUTION: Performed by: NURSE ANESTHETIST, CERTIFIED REGISTERED

## 2021-11-11 PROCEDURE — 0 CEFAZOLIN IN DEXTROSE 2-4 GM/100ML-% SOLUTION: Performed by: PHYSICIAN ASSISTANT

## 2021-11-11 DEVICE — FLOSEAL HEMOSTATIC MATRIX, 10ML
Type: IMPLANTABLE DEVICE | Site: SPINE LUMBAR | Status: FUNCTIONAL
Brand: FLOSEAL HEMOSTATIC MATRIX

## 2021-11-11 DEVICE — HEMOST ABS SURGIFOAM SZ100 8X12 10MM: Type: IMPLANTABLE DEVICE | Site: SPINE LUMBAR | Status: FUNCTIONAL

## 2021-11-11 RX ORDER — HYDROCODONE BITARTRATE AND ACETAMINOPHEN 5; 325 MG/1; MG/1
1 TABLET ORAL ONCE AS NEEDED
Status: DISCONTINUED | OUTPATIENT
Start: 2021-11-11 | End: 2021-11-11 | Stop reason: HOSPADM

## 2021-11-11 RX ORDER — CEFAZOLIN SODIUM 2 G/100ML
2 INJECTION, SOLUTION INTRAVENOUS ONCE
Status: COMPLETED | OUTPATIENT
Start: 2021-11-11 | End: 2021-11-11

## 2021-11-11 RX ORDER — GLYCOPYRROLATE 0.2 MG/ML
INJECTION INTRAMUSCULAR; INTRAVENOUS AS NEEDED
Status: DISCONTINUED | OUTPATIENT
Start: 2021-11-11 | End: 2021-11-11 | Stop reason: SURG

## 2021-11-11 RX ORDER — MEPERIDINE HYDROCHLORIDE 25 MG/ML
12.5 INJECTION INTRAMUSCULAR; INTRAVENOUS; SUBCUTANEOUS
Status: DISCONTINUED | OUTPATIENT
Start: 2021-11-11 | End: 2021-11-11 | Stop reason: HOSPADM

## 2021-11-11 RX ORDER — SODIUM CHLORIDE 0.9 % (FLUSH) 0.9 %
3 SYRINGE (ML) INJECTION EVERY 12 HOURS SCHEDULED
Status: DISCONTINUED | OUTPATIENT
Start: 2021-11-11 | End: 2021-11-11 | Stop reason: HOSPADM

## 2021-11-11 RX ORDER — MAGNESIUM HYDROXIDE 1200 MG/15ML
LIQUID ORAL AS NEEDED
Status: DISCONTINUED | OUTPATIENT
Start: 2021-11-11 | End: 2021-11-11 | Stop reason: HOSPADM

## 2021-11-11 RX ORDER — HYDROMORPHONE HYDROCHLORIDE 1 MG/ML
0.5 INJECTION, SOLUTION INTRAMUSCULAR; INTRAVENOUS; SUBCUTANEOUS
Status: DISCONTINUED | OUTPATIENT
Start: 2021-11-11 | End: 2021-11-11 | Stop reason: HOSPADM

## 2021-11-11 RX ORDER — SODIUM CHLORIDE, SODIUM LACTATE, POTASSIUM CHLORIDE, CALCIUM CHLORIDE 600; 310; 30; 20 MG/100ML; MG/100ML; MG/100ML; MG/100ML
9 INJECTION, SOLUTION INTRAVENOUS CONTINUOUS
Status: DISCONTINUED | OUTPATIENT
Start: 2021-11-11 | End: 2021-11-11 | Stop reason: HOSPADM

## 2021-11-11 RX ORDER — BUPIVACAINE HYDROCHLORIDE AND EPINEPHRINE 2.5; 5 MG/ML; UG/ML
INJECTION, SOLUTION EPIDURAL; INFILTRATION; INTRACAUDAL; PERINEURAL AS NEEDED
Status: DISCONTINUED | OUTPATIENT
Start: 2021-11-11 | End: 2021-11-11 | Stop reason: HOSPADM

## 2021-11-11 RX ORDER — FENTANYL CITRATE 50 UG/ML
INJECTION, SOLUTION INTRAMUSCULAR; INTRAVENOUS
Status: COMPLETED
Start: 2021-11-11 | End: 2021-11-11

## 2021-11-11 RX ORDER — PROMETHAZINE HYDROCHLORIDE 25 MG/1
25 SUPPOSITORY RECTAL ONCE AS NEEDED
Status: DISCONTINUED | OUTPATIENT
Start: 2021-11-11 | End: 2021-11-11 | Stop reason: HOSPADM

## 2021-11-11 RX ORDER — BUPROPION HYDROCHLORIDE 150 MG/1
TABLET, EXTENDED RELEASE ORAL
COMMUNITY
Start: 2021-09-10

## 2021-11-11 RX ORDER — OXYCODONE HYDROCHLORIDE AND ACETAMINOPHEN 5; 325 MG/1; MG/1
1 TABLET ORAL 3 TIMES DAILY PRN
Qty: 25 TABLET | Refills: 0 | Status: SHIPPED | OUTPATIENT
Start: 2021-11-11 | End: 2021-12-28

## 2021-11-11 RX ORDER — FLUTICASONE PROPIONATE 220 UG/1
AEROSOL, METERED RESPIRATORY (INHALATION)
COMMUNITY
Start: 2021-09-17

## 2021-11-11 RX ORDER — HYDROCODONE BITARTRATE AND ACETAMINOPHEN 5; 325 MG/1; MG/1
TABLET ORAL
Status: COMPLETED
Start: 2021-11-11 | End: 2021-11-11

## 2021-11-11 RX ORDER — METHYLPREDNISOLONE ACETATE 40 MG/ML
INJECTION, SUSPENSION INTRA-ARTICULAR; INTRALESIONAL; INTRAMUSCULAR; SOFT TISSUE AS NEEDED
Status: DISCONTINUED | OUTPATIENT
Start: 2021-11-11 | End: 2021-11-11 | Stop reason: HOSPADM

## 2021-11-11 RX ORDER — NALOXONE HCL 0.4 MG/ML
0.4 VIAL (ML) INJECTION AS NEEDED
Status: DISCONTINUED | OUTPATIENT
Start: 2021-11-11 | End: 2021-11-11 | Stop reason: HOSPADM

## 2021-11-11 RX ORDER — HYDRALAZINE HYDROCHLORIDE 20 MG/ML
5 INJECTION INTRAMUSCULAR; INTRAVENOUS
Status: DISCONTINUED | OUTPATIENT
Start: 2021-11-11 | End: 2021-11-11 | Stop reason: HOSPADM

## 2021-11-11 RX ORDER — PROMETHAZINE HYDROCHLORIDE 25 MG/1
25 TABLET ORAL ONCE AS NEEDED
Status: DISCONTINUED | OUTPATIENT
Start: 2021-11-11 | End: 2021-11-11 | Stop reason: SDUPTHER

## 2021-11-11 RX ORDER — SODIUM CHLORIDE 9 MG/ML
100 INJECTION, SOLUTION INTRAVENOUS CONTINUOUS
Status: DISCONTINUED | OUTPATIENT
Start: 2021-11-11 | End: 2021-11-11 | Stop reason: HOSPADM

## 2021-11-11 RX ORDER — ONDANSETRON 2 MG/ML
INJECTION INTRAMUSCULAR; INTRAVENOUS AS NEEDED
Status: DISCONTINUED | OUTPATIENT
Start: 2021-11-11 | End: 2021-11-11 | Stop reason: SURG

## 2021-11-11 RX ORDER — EPHEDRINE SULFATE 50 MG/ML
INJECTION, SOLUTION INTRAVENOUS AS NEEDED
Status: DISCONTINUED | OUTPATIENT
Start: 2021-11-11 | End: 2021-11-11 | Stop reason: SURG

## 2021-11-11 RX ORDER — MIDAZOLAM HYDROCHLORIDE 1 MG/ML
1 INJECTION INTRAMUSCULAR; INTRAVENOUS
Status: DISCONTINUED | OUTPATIENT
Start: 2021-11-11 | End: 2021-11-11 | Stop reason: HOSPADM

## 2021-11-11 RX ORDER — FENTANYL CITRATE 50 UG/ML
50 INJECTION, SOLUTION INTRAMUSCULAR; INTRAVENOUS
Status: DISCONTINUED | OUTPATIENT
Start: 2021-11-11 | End: 2021-11-11 | Stop reason: HOSPADM

## 2021-11-11 RX ORDER — LIDOCAINE HYDROCHLORIDE 10 MG/ML
0.5 INJECTION, SOLUTION EPIDURAL; INFILTRATION; INTRACAUDAL; PERINEURAL ONCE AS NEEDED
Status: COMPLETED | OUTPATIENT
Start: 2021-11-11 | End: 2021-11-11

## 2021-11-11 RX ORDER — SODIUM CHLORIDE 0.9 % (FLUSH) 0.9 %
10 SYRINGE (ML) INJECTION AS NEEDED
Status: DISCONTINUED | OUTPATIENT
Start: 2021-11-11 | End: 2021-11-11 | Stop reason: HOSPADM

## 2021-11-11 RX ORDER — KETAMINE HCL IN NACL, ISO-OSM 100MG/10ML
SYRINGE (ML) INJECTION AS NEEDED
Status: DISCONTINUED | OUTPATIENT
Start: 2021-11-11 | End: 2021-11-11 | Stop reason: SURG

## 2021-11-11 RX ORDER — PROMETHAZINE HYDROCHLORIDE 25 MG/1
25 TABLET ORAL ONCE AS NEEDED
Status: DISCONTINUED | OUTPATIENT
Start: 2021-11-11 | End: 2021-11-11 | Stop reason: HOSPADM

## 2021-11-11 RX ORDER — NEOSTIGMINE METHYLSULFATE 1 MG/ML
INJECTION, SOLUTION INTRAVENOUS AS NEEDED
Status: DISCONTINUED | OUTPATIENT
Start: 2021-11-11 | End: 2021-11-11 | Stop reason: SURG

## 2021-11-11 RX ORDER — ONDANSETRON 2 MG/ML
4 INJECTION INTRAMUSCULAR; INTRAVENOUS ONCE AS NEEDED
Status: DISCONTINUED | OUTPATIENT
Start: 2021-11-11 | End: 2021-11-11 | Stop reason: HOSPADM

## 2021-11-11 RX ORDER — FAMOTIDINE 10 MG/ML
20 INJECTION, SOLUTION INTRAVENOUS ONCE
Status: CANCELLED | OUTPATIENT
Start: 2021-11-11 | End: 2021-11-11

## 2021-11-11 RX ORDER — SODIUM CHLORIDE 0.9 % (FLUSH) 0.9 %
10 SYRINGE (ML) INJECTION EVERY 12 HOURS SCHEDULED
Status: CANCELLED | OUTPATIENT
Start: 2021-11-11

## 2021-11-11 RX ORDER — ESMOLOL HYDROCHLORIDE 10 MG/ML
INJECTION INTRAVENOUS AS NEEDED
Status: DISCONTINUED | OUTPATIENT
Start: 2021-11-11 | End: 2021-11-11 | Stop reason: SURG

## 2021-11-11 RX ORDER — PROPOFOL 10 MG/ML
VIAL (ML) INTRAVENOUS AS NEEDED
Status: DISCONTINUED | OUTPATIENT
Start: 2021-11-11 | End: 2021-11-11 | Stop reason: SURG

## 2021-11-11 RX ORDER — ROCURONIUM BROMIDE 10 MG/ML
INJECTION, SOLUTION INTRAVENOUS AS NEEDED
Status: DISCONTINUED | OUTPATIENT
Start: 2021-11-11 | End: 2021-11-11 | Stop reason: SURG

## 2021-11-11 RX ORDER — LIDOCAINE HYDROCHLORIDE 10 MG/ML
INJECTION, SOLUTION EPIDURAL; INFILTRATION; INTRACAUDAL; PERINEURAL AS NEEDED
Status: DISCONTINUED | OUTPATIENT
Start: 2021-11-11 | End: 2021-11-11 | Stop reason: SURG

## 2021-11-11 RX ORDER — FAMOTIDINE 20 MG/1
20 TABLET, FILM COATED ORAL ONCE
Status: COMPLETED | OUTPATIENT
Start: 2021-11-11 | End: 2021-11-11

## 2021-11-11 RX ORDER — DROPERIDOL 2.5 MG/ML
0.62 INJECTION, SOLUTION INTRAMUSCULAR; INTRAVENOUS ONCE AS NEEDED
Status: DISCONTINUED | OUTPATIENT
Start: 2021-11-11 | End: 2021-11-11 | Stop reason: HOSPADM

## 2021-11-11 RX ORDER — DEXAMETHASONE SODIUM PHOSPHATE 10 MG/ML
INJECTION INTRAMUSCULAR; INTRAVENOUS AS NEEDED
Status: DISCONTINUED | OUTPATIENT
Start: 2021-11-11 | End: 2021-11-11 | Stop reason: SURG

## 2021-11-11 RX ORDER — IPRATROPIUM BROMIDE AND ALBUTEROL SULFATE 2.5; .5 MG/3ML; MG/3ML
3 SOLUTION RESPIRATORY (INHALATION) ONCE AS NEEDED
Status: DISCONTINUED | OUTPATIENT
Start: 2021-11-11 | End: 2021-11-11 | Stop reason: HOSPADM

## 2021-11-11 RX ORDER — SODIUM CHLORIDE 0.9 % (FLUSH) 0.9 %
3-10 SYRINGE (ML) INJECTION AS NEEDED
Status: DISCONTINUED | OUTPATIENT
Start: 2021-11-11 | End: 2021-11-11 | Stop reason: HOSPADM

## 2021-11-11 RX ORDER — LABETALOL HYDROCHLORIDE 5 MG/ML
5 INJECTION, SOLUTION INTRAVENOUS
Status: DISCONTINUED | OUTPATIENT
Start: 2021-11-11 | End: 2021-11-11 | Stop reason: HOSPADM

## 2021-11-11 RX ORDER — DEXMEDETOMIDINE HYDROCHLORIDE 100 UG/ML
INJECTION, SOLUTION INTRAVENOUS AS NEEDED
Status: DISCONTINUED | OUTPATIENT
Start: 2021-11-11 | End: 2021-11-11 | Stop reason: SURG

## 2021-11-11 RX ORDER — SODIUM CHLORIDE, SODIUM LACTATE, POTASSIUM CHLORIDE, CALCIUM CHLORIDE 600; 310; 30; 20 MG/100ML; MG/100ML; MG/100ML; MG/100ML
INJECTION, SOLUTION INTRAVENOUS CONTINUOUS PRN
Status: DISCONTINUED | OUTPATIENT
Start: 2021-11-11 | End: 2021-11-11 | Stop reason: SURG

## 2021-11-11 RX ORDER — DROPERIDOL 2.5 MG/ML
0.62 INJECTION, SOLUTION INTRAMUSCULAR; INTRAVENOUS AS NEEDED
Status: DISCONTINUED | OUTPATIENT
Start: 2021-11-11 | End: 2021-11-11 | Stop reason: HOSPADM

## 2021-11-11 RX ADMIN — HYDROCODONE BITARTATE AND ACETAMINOPHEN 1 TABLET: 5; 325 TABLET ORAL at 14:21

## 2021-11-11 RX ADMIN — FAMOTIDINE 20 MG: 20 TABLET ORAL at 09:35

## 2021-11-11 RX ADMIN — ROCURONIUM BROMIDE 50 MG: 10 INJECTION, SOLUTION INTRAVENOUS at 12:15

## 2021-11-11 RX ADMIN — CEFAZOLIN SODIUM 2 G: 2 INJECTION, SOLUTION INTRAVENOUS at 12:26

## 2021-11-11 RX ADMIN — DEXMEDETOMIDINE HYDROCHLORIDE 8 MCG: 100 INJECTION, SOLUTION INTRAVENOUS at 12:36

## 2021-11-11 RX ADMIN — LIDOCAINE HYDROCHLORIDE 50 MG: 10 INJECTION, SOLUTION EPIDURAL; INFILTRATION; INTRACAUDAL; PERINEURAL at 12:15

## 2021-11-11 RX ADMIN — FENTANYL CITRATE 50 MCG: 50 INJECTION, SOLUTION INTRAMUSCULAR; INTRAVENOUS at 14:00

## 2021-11-11 RX ADMIN — Medication 30 MG: at 12:24

## 2021-11-11 RX ADMIN — DEXAMETHASONE SODIUM PHOSPHATE 8 MG: 10 INJECTION INTRAMUSCULAR; INTRAVENOUS at 12:30

## 2021-11-11 RX ADMIN — ESMOLOL HYDROCHLORIDE 60 MG: 10 INJECTION, SOLUTION INTRAVENOUS at 12:15

## 2021-11-11 RX ADMIN — NEOSTIGMINE METHYLSULFATE 4 MG: 0.5 INJECTION INTRAVENOUS at 13:30

## 2021-11-11 RX ADMIN — EPHEDRINE SULFATE 5 MG: 50 INJECTION INTRAVENOUS at 13:15

## 2021-11-11 RX ADMIN — SODIUM CHLORIDE, POTASSIUM CHLORIDE, SODIUM LACTATE AND CALCIUM CHLORIDE: 600; 310; 30; 20 INJECTION, SOLUTION INTRAVENOUS at 12:12

## 2021-11-11 RX ADMIN — HYDROMORPHONE HYDROCHLORIDE 0.5 MG: 1 INJECTION, SOLUTION INTRAMUSCULAR; INTRAVENOUS; SUBCUTANEOUS at 14:17

## 2021-11-11 RX ADMIN — SODIUM CHLORIDE, POTASSIUM CHLORIDE, SODIUM LACTATE AND CALCIUM CHLORIDE 9 ML/HR: 600; 310; 30; 20 INJECTION, SOLUTION INTRAVENOUS at 09:37

## 2021-11-11 RX ADMIN — HYDROMORPHONE HYDROCHLORIDE 0.5 MG: 1 INJECTION, SOLUTION INTRAMUSCULAR; INTRAVENOUS; SUBCUTANEOUS at 14:26

## 2021-11-11 RX ADMIN — LIDOCAINE HYDROCHLORIDE 0.5 ML: 10 INJECTION, SOLUTION EPIDURAL; INFILTRATION; INTRACAUDAL; PERINEURAL at 09:34

## 2021-11-11 RX ADMIN — FENTANYL CITRATE 50 MCG: 50 INJECTION, SOLUTION INTRAMUSCULAR; INTRAVENOUS at 14:10

## 2021-11-11 RX ADMIN — HYDROCODONE BITARTRATE AND ACETAMINOPHEN 1 TABLET: 5; 325 TABLET ORAL at 14:21

## 2021-11-11 RX ADMIN — GLYCOPYRROLATE 0.4 MG: 0.2 INJECTION INTRAMUSCULAR; INTRAVENOUS at 13:30

## 2021-11-11 RX ADMIN — PROPOFOL 200 MG: 10 INJECTION, EMULSION INTRAVENOUS at 12:15

## 2021-11-11 RX ADMIN — SODIUM CHLORIDE, PRESERVATIVE FREE 10 ML: 5 INJECTION INTRAVENOUS at 09:38

## 2021-11-11 RX ADMIN — ONDANSETRON 4 MG: 2 INJECTION INTRAMUSCULAR; INTRAVENOUS at 13:26

## 2021-11-11 NOTE — ANESTHESIA PREPROCEDURE EVALUATION
Anesthesia Evaluation     Patient summary reviewed and Nursing notes reviewed   NPO Solid Status: > 8 hours  NPO Liquid Status: > 2 hours           Airway   Mallampati: I  TM distance: >3 FB  Neck ROM: full  No difficulty expected  Dental      Pulmonary     breath sounds clear to auscultation  (+) asthma,  Cardiovascular     ECG reviewed  Rhythm: regular  Rate: normal    (+) hypertension,       Neuro/Psych  (+) numbness, psychiatric history Bipolar,     GI/Hepatic/Renal/Endo      Musculoskeletal     (+) back pain,   Abdominal    Substance History      OB/GYN          Other                        Anesthesia Plan    ASA 2     general     intravenous induction     Anesthetic plan, all risks, benefits, and alternatives have been provided, discussed and informed consent has been obtained with: patient.    Plan discussed with CRNA.

## 2021-11-11 NOTE — ANESTHESIA PROCEDURE NOTES
Airway  Urgency: elective    Date/Time: 11/11/2021 12:18 PM  Airway not difficult    General Information and Staff    Patient location during procedure: OR  Anesthesiologist: Rishabh Munguia MD  CRNA: Killian Ansari CRNA    Indications and Patient Condition  Indications for airway management: airway protection    Preoxygenated: yes  MILS not maintained throughout  Mask difficulty assessment: 1 - vent by mask    Final Airway Details  Final airway type: endotracheal airway      Successful airway: ETT  Cuffed: yes   Successful intubation technique: video laryngoscopy  Endotracheal tube insertion site: oral  Blade: Diaz  Blade size: 3  ETT size (mm): 7.5  Cormack-Lehane Classification: grade I - full view of glottis  Placement verified by: chest auscultation and capnometry   Measured from: lips  ETT/EBT  to lips (cm): 22  Number of attempts at approach: 1  Assessment: lips, teeth, and gum same as pre-op and atraumatic intubation    Additional Comments  Negative epigastric sounds, Breath sound equal bilaterally with symmetric chest rise and fall

## 2021-11-11 NOTE — ANESTHESIA POSTPROCEDURE EVALUATION
Patient: Eddie Lindsay    Procedure Summary     Date: 11/11/21 Room / Location:  [facility]  [facility]    Anesthesia Start: 1212 Anesthesia Stop: 1349    Procedure: right L3-4 extraforaminal discectomy (Right Spine Lumbar) Diagnosis:       Lumbar radiculopathy      (Lumbar radiculopathy [M54.16])    Surgeons: Fredy Heart MD Provider: Rishabh Munguia MD    Anesthesia Type: general ASA Status: 2          Anesthesia Type: general    Vitals  Vitals Value Taken Time   /61 11/11/21 1346   Temp     Pulse 68 11/11/21 1347   Resp     SpO2 94 % 11/11/21 1349   Vitals shown include unvalidated device data.        Post Anesthesia Care and Evaluation    Patient location during evaluation: PACU  Patient participation: waiting for patient participation  Level of consciousness: lethargic and responsive to physical stimuli  Pain management: adequate  Airway patency: patent  Anesthetic complications: No anesthetic complications  PONV Status: none  Cardiovascular status: hemodynamically stable and acceptable  Respiratory status: nonlabored ventilation, acceptable, nasal cannula and oral airway  Hydration status: acceptable

## 2021-11-19 ENCOUNTER — TELEPHONE (OUTPATIENT)
Dept: NEUROSURGERY | Facility: CLINIC | Age: 31
End: 2021-11-19

## 2021-11-22 NOTE — TELEPHONE ENCOUNTER
PATIENT CB. ATTEMPTED TO WARM TRANSFER; PER JAX DEUTSCH WAS NOT AVAIL AT THE MOMENT AND WILL CB. DOCUMENTING PER [employer].

## 2021-11-24 ENCOUNTER — TELEPHONE (OUTPATIENT)
Dept: NEUROSURGERY | Facility: CLINIC | Age: 31
End: 2021-11-24

## 2021-11-24 NOTE — TELEPHONE ENCOUNTER
PLEASE SEE PREVIOUS MY CHART MESSAGES- PT HAS BEEN TRYING TO GET IN CONTACT WITH OUR OFFICE TO SCHEDULE.      ----- Message from Eddie Lindsay sent at 11/24/2021  4:47 PM EST -----  Regarding: Swollen foot  So I called the same day you told me to, to try and set up my follow up but I never got ahold of the lady you spoke of. I was told I’d get a call back and it’s now day two and haven’t heard anything back from anyone.

## 2021-12-03 ENCOUNTER — OFFICE VISIT (OUTPATIENT)
Dept: NEUROSURGERY | Facility: CLINIC | Age: 31
End: 2021-12-03

## 2021-12-03 VITALS
TEMPERATURE: 97.6 F | HEIGHT: 70 IN | DIASTOLIC BLOOD PRESSURE: 104 MMHG | WEIGHT: 198 LBS | SYSTOLIC BLOOD PRESSURE: 138 MMHG | BODY MASS INDEX: 28.35 KG/M2

## 2021-12-03 DIAGNOSIS — M51.36 DISC DEGENERATION, LUMBAR: Primary | ICD-10-CM

## 2021-12-03 DIAGNOSIS — M54.16 LUMBAR RADICULOPATHY: ICD-10-CM

## 2021-12-03 DIAGNOSIS — Z71.6 ENCOUNTER FOR SMOKING CESSATION COUNSELING: ICD-10-CM

## 2021-12-03 PROCEDURE — 99024 POSTOP FOLLOW-UP VISIT: CPT | Performed by: PHYSICIAN ASSISTANT

## 2021-12-03 RX ORDER — CYCLOBENZAPRINE HCL 10 MG
10 TABLET ORAL 3 TIMES DAILY PRN
Qty: 45 TABLET | Refills: 0 | Status: SHIPPED | OUTPATIENT
Start: 2021-12-03 | End: 2021-12-28

## 2021-12-03 NOTE — PROGRESS NOTES
Patient: Eddie Lindsay  : 1990  Chart #: [id number]    Date of Service: 12/3/2021    CHIEF COMPLAINT: Right L3-4 far lateral disc herniation    History of Present Illness Patient is a 31-year-old  at [employer] who presented to our clinic with back and right thigh pain as well as sensory alteration.  Preoperative studies raised the specter of foraminal to extraforaminal disc protrusion on the right at L3-4 felt to provide clinical correlation.  He had ongoing pain despite conservative measures.  As such on 2021 he underwent an uncomplicated right L3-4 extraforaminal discectomy.    Patient is 3 weeks postop.  He reports his leg pain has improved but he continues with some incisional discomfort.  He also still feels some numbness in the leg.    Past Medical History:   Diagnosis Date   • Anxiety and depression    • Asthma    • Asthma    • Bipolar 2 disorder (HCC)    • Body piercing     tongue and lip   • Heart murmur    • Hernia of testicle    • History of concussion     middle school football injury   • Hypertension    • Low back pain    • Migraines    • Panic attack    • Syncope and collapse    • Tattoos    • Wears glasses     driving         Current Outpatient Medications:   •  amLODIPine (NORVASC) 5 MG tablet, Take 1 tablet by mouth daily. (Patient taking differently: Take 5 mg by mouth 2 (Two) Times a Day.), Disp: 30 tablet, Rfl: 0  •  buPROPion SR (WELLBUTRIN SR) 150 MG 12 hr tablet, , Disp: , Rfl:   •  cyclobenzaprine (FLEXERIL) 10 MG tablet, Take 1 tablet by mouth 3 (Three) Times a Day As Needed for Muscle Spasms., Disp: 45 tablet, Rfl: 0  •  Flovent  MCG/ACT inhaler, , Disp: , Rfl:   •  hydroCHLOROthiazide (HYDRODIURIL) 50 MG tablet, Take 12.5 mg by mouth Daily., Disp: , Rfl:   •  oxyCODONE-acetaminophen (PERCOCET) 5-325 MG per tablet, Take 1 tablet by mouth 3 (Three) Times a Day As Needed (Pain)., Disp: 25 tablet, Rfl: 0    Past Surgical History:   Procedure Laterality  "Date   • DENTAL PROCEDURE      wisdom teeth, all 4 removed   • HERNIA REPAIR     • LUMBAR DISCECTOMY Right 11/11/2021    Procedure: right L3-4 extraforaminal discectomy;  Surgeon: Fredy Heart MD;  Location: [facility];  Service: Neurosurgery;  Laterality: Right;       Social History     Socioeconomic History   • Marital status: Single   Tobacco Use   • Smoking status: Current Every Day Smoker     Packs/day: 0.25     Types: Cigarettes   • Smokeless tobacco: Never Used   • Tobacco comment: 0.25 or less per day   Vaping Use   • Vaping Use: Some days   • Substances: Nicotine, Flavoring   • Devices: Disposable   Substance and Sexual Activity   • Alcohol use: No   • Drug use: No   • Sexual activity: Yes     Partners: Female         Review of Systems   Musculoskeletal: Positive for back pain.   All other systems reviewed and are negative.      Objective   Vital Signs: Blood pressure (!) 138/104, temperature 97.6 °F (36.4 °C), height 177.8 cm (70\"), weight 89.8 kg (198 lb).  Physical Exam  Skin:     Comments: Well-healed lumbar incision       Assessment/Plan   Diagnosis: Right L3-4 far lateral disc herniation s/p discectomy     Medical Decision Making: Patient is 3 weeks postop and doing well.  His radicular symptoms have improved however he still has some incisional discomfort which should improve with more time.  His low back feels very tight.  I gave him a muscle relaxer and advised him to use some ibuprofen.  He will plan to return to work in 3-4 weeks.  Follow-up with Dr. Heart in 6 weeks.  Call the office in the interim with any questions or concerns.    Diagnoses and all orders for this visit:    1. Disc degeneration, lumbar (Primary)    2. Lumbar radiculopathy    3. Encounter for smoking cessation counseling    Other orders  -     cyclobenzaprine (FLEXERIL) 10 MG tablet; Take 1 tablet by mouth 3 (Three) Times a Day As Needed for Muscle Spasms.  Dispense: 45 tablet; Refill: 0                          [place] " Win Kidd PA-C  Patient Care Team:  Jt Ruiz MD as PCP - General (Internal Medicine)

## 2022-12-14 ENCOUNTER — OFFICE VISIT (OUTPATIENT)
Dept: NEUROSURGERY | Facility: CLINIC | Age: 32
End: 2022-12-14

## 2022-12-14 VITALS — TEMPERATURE: 97.7 F | BODY MASS INDEX: 24.48 KG/M2 | WEIGHT: 171 LBS | HEIGHT: 70 IN

## 2022-12-14 DIAGNOSIS — R39.15 URINARY URGENCY: ICD-10-CM

## 2022-12-14 DIAGNOSIS — M54.16 LUMBAR RADICULOPATHY: Primary | ICD-10-CM

## 2022-12-14 DIAGNOSIS — R63.4 RECENT UNEXPLAINED WEIGHT LOSS: ICD-10-CM

## 2022-12-14 PROCEDURE — 99213 OFFICE O/P EST LOW 20 MIN: CPT | Performed by: PHYSICIAN ASSISTANT

## 2022-12-14 NOTE — PROGRESS NOTES
"Subjective     Chief Complaint: back pain with right leg weakness and urinary urgency.     Patient ID: Eddie Lindsay is a 32 y.o. male is here today for follow-up.    History of Present Illness  Mr Lindsay is a 32-year-old  at [employer] who presented to our clinic with back and right thigh pain as well as sensory alteration.  MRI showed an  extraforaminal disc protrusion on the right at L3-4 felt to provide clinical correlation.   As such on 11/11/2021 he underwent an uncomplicated right L3-4 extraforaminal discectomy. He had good relief of his pain, though ongoing numbness of his foot.  Today, he presents with a 4 month history of increasing back pain radiating into his right buttock. He states that his right knee occasionally \"rhea\" and is partner notes some balance issues, but he has not had any falls.   He also notes urinary urgency with needing to go every 30-60 min. He has also had about 30lbs unintentional weight loss since this spring. He denies saddle anesthesia, testicular pain, or bowel/bladder dysfunction. He does continue to have some numbness of his right foot and leg which is unchanged       The following portions of the patient's history were reviewed and updated as appropriate: allergies, current medications, past family history, past medical history, past social history, past surgical history and problem list.    Past Medical History:   Diagnosis Date   • Anxiety and depression    • Asthma    • Asthma    • Bipolar 2 disorder (HCC)    • Body piercing     tongue and lip   • Heart murmur    • Hernia of testicle    • History of concussion     middle school football injury   • Hypertension    • Low back pain    • Migraines    • Panic attack    • Syncope and collapse 2008   • Tattoos    • Wears glasses     driving     Past Surgical History:   Procedure Laterality Date   • DENTAL PROCEDURE      wisdom teeth, all 4 removed   • HERNIA REPAIR     • LUMBAR DISCECTOMY Right 11/11/2021    " "Procedure: right L3-4 extraforaminal discectomy;  Surgeon: Fredy Heart MD;  Location: [facility];  Service: Neurosurgery;  Laterality: Right;       Family history:   Family History   Adopted: Yes       Social history:   Social History     Socioeconomic History   • Marital status: Single   Tobacco Use   • Smoking status: Every Day     Packs/day: 0.25     Types: Cigarettes   • Smokeless tobacco: Never   • Tobacco comments:     0.25 or less per day   Vaping Use   • Vaping Use: Some days   • Substances: Nicotine, Flavoring   • Devices: Disposable   Substance and Sexual Activity   • Alcohol use: No   • Drug use: No   • Sexual activity: Yes     Partners: Female       Review of Systems   Constitutional: Positive for unexpected weight change.   Respiratory: Positive for apnea.    Endocrine: Positive for polyuria.   Genitourinary: Positive for frequency and urgency.   Musculoskeletal: Positive for back pain and neck pain.   Neurological: Positive for weakness, numbness and headaches.   Psychiatric/Behavioral: Positive for agitation, dysphoric mood, hallucinations and sleep disturbance. The patient is nervous/anxious.    All other systems reviewed and are negative.      Objective   Temperature 97.7 °F (36.5 °C), temperature source Temporal, height 177.8 cm (70\"), weight 77.6 kg (171 lb).  Body mass index is 24.54 kg/m².    Physical Exam   CONSTITUTIONAL:   - Patient is well-nourished  - Pleasant and appears stated age.  PSYCHIATRIC:  - Normal mood and affect  - Behavior is normal.  HENT:   Head: Normocephalic and Atraumatic.   Eyes:     - Pupils are equal, round, and reactive to light.     - EOM are normal.   CV:   - Regular rate and rhythm on palpable radial pulse   PULMONARY:   - Speaking in full sentences, breathing non labored  - No wheezing   SKIN:  - Clean, dry and intact   MUSCULOSKELETAL:  - Low back tenderness  - Strength is intact in the upper and lower extremities to direct testing.  - Muscle tone is normal " throughout.  - Station and gait are antalgic  - ROM in neck/back is reduced secondary to pain  - Straight leg raise is negative   NEUROLOGICAL:  - A&Ox3  - Attention span, language function, and cognition are intact.  - Sensation is mildly reduced in right leg and foot compared to left side.  - Deep tendon reflexes are 1+ and symmetrical.  .  - Coordination is intact.       Assessment & Plan   Mr Lindsay does not have any new imaging of his back. We will order an MRI of the lumbar spine with and without contrast as he has previously had surgery. I am also concerned about his weight loss and frequent urination. He has no personal history of diabetes or cancer. He is adopted, so does not know if he has a strong family history of diabetes. I have ordered labs to review and the patient is requested to have these done today.  We will plan to see him back in the office after he obtains his MRi of the lumbar spine.     Diagnoses and all orders for this visit:    1. Lumbar radiculopathy (Primary)  -     CBC & Differential  -     Basic Metabolic Panel; Future  -     Hemoglobin A1c; Future  -     MRI Lumbar Spine With & Without Contrast; Future    2. Recent unexplained weight loss  -     CBC & Differential  -     Basic Metabolic Panel; Future  -     Hemoglobin A1c; Future  -     MRI Lumbar Spine With & Without Contrast; Future    3. Urinary urgency  -     CBC & Differential  -     Basic Metabolic Panel; Future  -     Hemoglobin A1c; Future  -     MRI Lumbar Spine With & Without Contrast; Future        Return in about 3 weeks (around 1/4/2023).    Karla Nguyen PA-C        This document signed by Karla Nguyen PA-C  December 15, 2022 14:09 EST

## 2022-12-15 ENCOUNTER — LAB (OUTPATIENT)
Dept: LAB | Facility: HOSPITAL | Age: 32
End: 2022-12-15

## 2022-12-15 DIAGNOSIS — R63.4 RECENT UNEXPLAINED WEIGHT LOSS: ICD-10-CM

## 2022-12-15 DIAGNOSIS — R39.15 URINARY URGENCY: ICD-10-CM

## 2022-12-15 DIAGNOSIS — M54.16 LUMBAR RADICULOPATHY: ICD-10-CM

## 2022-12-15 LAB — HBA1C MFR BLD: 5.4 % (ref 4.8–5.6)

## 2022-12-15 PROCEDURE — 80048 BASIC METABOLIC PNL TOTAL CA: CPT

## 2022-12-15 PROCEDURE — 83036 HEMOGLOBIN GLYCOSYLATED A1C: CPT

## 2022-12-15 PROCEDURE — 85025 COMPLETE CBC W/AUTO DIFF WBC: CPT | Performed by: PHYSICIAN ASSISTANT

## 2022-12-15 PROCEDURE — 36415 COLL VENOUS BLD VENIPUNCTURE: CPT

## 2022-12-15 PROCEDURE — 85007 BL SMEAR W/DIFF WBC COUNT: CPT | Performed by: PHYSICIAN ASSISTANT

## 2022-12-16 LAB
ANION GAP SERPL CALCULATED.3IONS-SCNC: 6.5 MMOL/L (ref 5–15)
BASOPHILS # BLD MANUAL: 0.08 10*3/MM3 (ref 0–0.2)
BASOPHILS NFR BLD MANUAL: 1.1 % (ref 0–1.5)
BUN SERPL-MCNC: 8 MG/DL (ref 6–20)
BUN/CREAT SERPL: 10 (ref 7–25)
CALCIUM SPEC-SCNC: 9.6 MG/DL (ref 8.6–10.5)
CHLORIDE SERPL-SCNC: 99 MMOL/L (ref 98–107)
CO2 SERPL-SCNC: 30.5 MMOL/L (ref 22–29)
CREAT SERPL-MCNC: 0.8 MG/DL (ref 0.76–1.27)
DEPRECATED RDW RBC AUTO: 39.3 FL (ref 37–54)
EGFRCR SERPLBLD CKD-EPI 2021: 119.8 ML/MIN/1.73
EOSINOPHIL # BLD MANUAL: 0.23 10*3/MM3 (ref 0–0.4)
EOSINOPHIL NFR BLD MANUAL: 3.2 % (ref 0.3–6.2)
ERYTHROCYTE [DISTWIDTH] IN BLOOD BY AUTOMATED COUNT: 12.5 % (ref 12.3–15.4)
GLUCOSE SERPL-MCNC: 92 MG/DL (ref 65–99)
HCT VFR BLD AUTO: 46.5 % (ref 37.5–51)
HGB BLD-MCNC: 16.2 G/DL (ref 13–17.7)
LYMPHOCYTES # BLD MANUAL: 4.03 10*3/MM3 (ref 0.7–3.1)
LYMPHOCYTES NFR BLD MANUAL: 12.6 % (ref 5–12)
MCH RBC QN AUTO: 30.1 PG (ref 26.6–33)
MCHC RBC AUTO-ENTMCNC: 34.8 G/DL (ref 31.5–35.7)
MCV RBC AUTO: 86.4 FL (ref 79–97)
MONOCYTES # BLD: 0.91 10*3/MM3 (ref 0.1–0.9)
NEUTROPHILS # BLD AUTO: 1.98 10*3/MM3 (ref 1.7–7)
NEUTROPHILS NFR BLD MANUAL: 27.4 % (ref 42.7–76)
NRBC BLD AUTO-RTO: 0 /100 WBC (ref 0–0.2)
NRBC SPEC MANUAL: 1.1 /100 WBC (ref 0–0.2)
PLAT MORPH BLD: NORMAL
PLATELET # BLD AUTO: 154 10*3/MM3 (ref 140–450)
PMV BLD AUTO: 13 FL (ref 6–12)
POTASSIUM SERPL-SCNC: 3.7 MMOL/L (ref 3.5–5.2)
RBC # BLD AUTO: 5.38 10*6/MM3 (ref 4.14–5.8)
RBC MORPH BLD: NORMAL
SMUDGE CELLS BLD QL SMEAR: ABNORMAL
SODIUM SERPL-SCNC: 136 MMOL/L (ref 136–145)
VARIANT LYMPHS NFR BLD MANUAL: 55.8 % (ref 19.6–45.3)
WBC NRBC COR # BLD: 7.22 10*3/MM3 (ref 3.4–10.8)

## 2023-01-31 DIAGNOSIS — M54.16 LUMBAR RADICULOPATHY: Primary | ICD-10-CM

## 2023-03-27 ENCOUNTER — HOSPITAL ENCOUNTER (OUTPATIENT)
Dept: NEUROLOGY | Facility: HOSPITAL | Age: 33
Discharge: HOME | End: 2023-03-27
Admitting: PHYSICIAN ASSISTANT
Payer: MEDICAID

## 2023-03-27 DIAGNOSIS — M54.16 LUMBAR RADICULOPATHY: ICD-10-CM

## 2023-03-27 PROCEDURE — 95909 NRV CNDJ TST 5-6 STUDIES: CPT

## 2023-03-27 PROCEDURE — 95886 MUSC TEST DONE W/N TEST COMP: CPT

## 2023-03-28 ENCOUNTER — OFFICE VISIT (OUTPATIENT)
Dept: NEUROSURGERY | Facility: CLINIC | Age: 33
End: 2023-03-28
Payer: MEDICAID

## 2023-03-28 VITALS — BODY MASS INDEX: 23.19 KG/M2 | WEIGHT: 162 LBS | TEMPERATURE: 97.2 F | HEIGHT: 70 IN | RESPIRATION RATE: 18 BRPM

## 2023-03-28 DIAGNOSIS — R39.15 URINARY URGENCY: ICD-10-CM

## 2023-03-28 DIAGNOSIS — M54.16 LUMBAR RADICULOPATHY: Primary | ICD-10-CM

## 2023-03-28 DIAGNOSIS — M51.36 DISC DEGENERATION, LUMBAR: ICD-10-CM

## 2023-03-28 PROBLEM — A60.00 HERPES GENITALIS: Status: ACTIVE | Noted: 2023-01-19

## 2023-03-28 PROBLEM — J30.2 SEASONAL ALLERGIES: Status: ACTIVE | Noted: 2023-01-19

## 2023-03-28 PROBLEM — G43.909 MIGRAINE HEADACHE: Status: ACTIVE | Noted: 2020-08-11

## 2023-03-28 PROBLEM — J45.909 ASTHMA: Status: ACTIVE | Noted: 2020-12-08

## 2023-03-28 PROBLEM — L30.9 ECZEMA: Status: ACTIVE | Noted: 2023-01-19

## 2023-03-28 PROBLEM — I10 HYPERTENSION: Status: ACTIVE | Noted: 2023-01-19

## 2023-03-28 PROBLEM — G47.00 INSOMNIA: Status: ACTIVE | Noted: 2020-12-08

## 2023-03-28 PROCEDURE — 1159F MED LIST DOCD IN RCRD: CPT | Performed by: PHYSICIAN ASSISTANT

## 2023-03-28 PROCEDURE — 99214 OFFICE O/P EST MOD 30 MIN: CPT | Performed by: PHYSICIAN ASSISTANT

## 2023-03-28 PROCEDURE — 1160F RVW MEDS BY RX/DR IN RCRD: CPT | Performed by: PHYSICIAN ASSISTANT

## 2023-03-28 RX ORDER — GABAPENTIN 300 MG/1
CAPSULE ORAL
Qty: 90 CAPSULE | Refills: 0 | Status: SHIPPED | OUTPATIENT
Start: 2023-03-28

## 2023-03-28 NOTE — PROGRESS NOTES
Patient: Eddie Lindsay  : 1990  Chart #: [id number]    Date of Service: 2023    CHIEF COMPLAINT: Right leg weakness and urinary urgency    History of Present Illness Mr. Lindsay is seen in follow-up.  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  Ultimately on 2021 he underwent an uncomplicated right L3-4 extraforaminal discectomy.  He had good relief of his pain although has had ongoing numbness involving his foot.  More recently he presented with increasing back pain radiating into his right buttock and down the back of the right leg. Typically does not extend beyond the knee.  He states that his right knee occasionally rhea and his partner notices some balance issues but he has not had any falls.  He also notes urinary urgency needing to go every 30-60 minutes.  Recently he has completed 6 weeks of formal physical therapy with no improvement in symptoms.  He has no prior history of epidural injections and has no interest in ever trying them.  He is here today to review electrodiagnostic studies.    Past Medical History:   Diagnosis Date   • Anxiety and depression    • Asthma    • Asthma    • Bipolar 2 disorder (HCC)    • Body piercing     tongue and lip   • Heart murmur    • Hernia of testicle    • History of concussion     middle school football injury   • Hypertension    • Low back pain    • Migraines    • Panic attack    • Syncope and collapse    • Tattoos    • Wears glasses     driving         Current Outpatient Medications:   •  albuterol (PROVENTIL) (2.5 MG/3ML) 0.083% nebulizer solution, , Disp: , Rfl:   •  amLODIPine (NORVASC) 5 MG tablet, Take 1 tablet by mouth daily. (Patient taking differently: Take 1 tablet by mouth 2 (Two) Times a Day.), Disp: 30 tablet, Rfl: 0  •  amLODIPine-benazepril (LOTREL 5-10) 5-10 MG per capsule, Take 1 capsule by mouth Daily., Disp: , Rfl:   •  buPROPion SR  (WELLBUTRIN SR) 150 MG 12 hr tablet, , Disp: , Rfl:   •  cetirizine (zyrTEC) 10 MG tablet, Take 1 tablet by mouth Daily., Disp: , Rfl:   •  Flovent  MCG/ACT inhaler, , Disp: , Rfl:   •  fluticasone (FLONASE) 50 MCG/ACT nasal spray, USE 1-2 SPRAYS PER NOSTRIL DAILY., Disp: , Rfl:   •  hydroCHLOROthiazide (MICROZIDE) 12.5 MG capsule, Take 1 capsule by mouth Daily., Disp: , Rfl:   •  ondansetron (ZOFRAN) 4 MG tablet, Take 1 tablet by mouth Every 8 (Eight) Hours As Needed for Nausea or Vomiting., Disp: 15 tablet, Rfl: 0  •  SUMAtriptan (IMITREX) 100 MG tablet, , Disp: , Rfl:   •  traZODone (DESYREL) 50 MG tablet, , Disp: , Rfl:   •  triamcinolone (KENALOG) 0.1 % cream, , Disp: , Rfl:   •  valACYclovir (VALTREX) 500 MG tablet, , Disp: , Rfl:   •  gabapentin (NEURONTIN) 300 MG capsule, One tab at night x 3 days, then one tab BID, then one tab TID., Disp: 90 capsule, Rfl: 0    Past Surgical History:   Procedure Laterality Date   • DENTAL PROCEDURE      wisdom teeth, all 4 removed   • HERNIA REPAIR     • LUMBAR DISCECTOMY Right 11/11/2021    Procedure: right L3-4 extraforaminal discectomy;  Surgeon: Fredy Heart MD;  Location: [facility];  Service: Neurosurgery;  Laterality: Right;       Social History     Socioeconomic History   • Marital status: Single   Tobacco Use   • Smoking status: Every Day     Packs/day: 0.25     Types: Cigarettes   • Smokeless tobacco: Never   • Tobacco comments:     0.25 or less per day   Vaping Use   • Vaping Use: Some days   • Substances: Nicotine, Flavoring   • Devices: Disposable   Substance and Sexual Activity   • Alcohol use: No   • Drug use: No   • Sexual activity: Yes     Partners: Female         Review of Systems   Constitutional: Positive for unexpected weight change. Negative for activity change, appetite change, chills and fever.   HENT: Negative for facial swelling, hearing loss, tinnitus, trouble swallowing and voice change.    Eyes: Negative for pain and visual  "disturbance.   Respiratory: Negative for apnea and shortness of breath.    Cardiovascular: Negative for leg swelling.   Gastrointestinal: Negative for constipation, nausea and vomiting.   Genitourinary: Positive for urgency. Negative for difficulty urinating and dysuria.   Musculoskeletal: Positive for back pain and gait problem. Negative for joint swelling, neck pain and neck stiffness.   Skin: Negative for color change.   Neurological: Positive for numbness. Negative for dizziness, facial asymmetry, speech difficulty and weakness.   Psychiatric/Behavioral: Negative for behavioral problems, confusion, dysphoric mood and sleep disturbance. The patient is not nervous/anxious.        Objective   Vital Signs: Temperature 97.2 °F (36.2 °C), temperature source Infrared, resp. rate 18, height 177.8 cm (70\"), weight 73.5 kg (162 lb).  Physical Exam  Vitals and nursing note reviewed.   Constitutional:       General: He is not in acute distress.     Appearance: He is well-developed.   HENT:      Head: Normocephalic and atraumatic.   Pulmonary:      Breath sounds: Normal breath sounds.   Psychiatric:         Behavior: Behavior normal.         Thought Content: Thought content normal.     Musculoskeletal:     Strength is intact in upper and lower extremities to direct testing.     Station and gait are normal.     Straight leg raising is negative.   Neurologic:     Muscle tone is normal throughout.     Coordination is intact.     Sensation is intact to light touch throughout.     Patient is oriented to person, place, and time.         Independent review of radiographic imaging: No recent imaging    Electrodiagnostic studies demonstrate chronic L3-4 radiculopathy on the right    Hemoglobin A1c 5.4    Assessment & Plan   Diagnosis:  1.  Lumbar radiculopathy  2.  Unintentional weight loss    Medical Decision Making: Patient has completed 6 weeks of formal physical therapy without improvement in symptoms.  I am going to refer him " for an MRI of the lumbar spine with and without gadolinium for completeness.  I have prescribed Neurontin to treat his symptoms.  I reviewed laboratory results and diagnostic imaging that was done last week at the [facility] during his ED visit.  I would like him to follow-up with his primary care physician concerning the reported unintentional weight loss over the past year.  He will follow-up with me to review MRI and further recommendations will be made at that time    Diagnoses and all orders for this visit:    1. Lumbar radiculopathy (Primary)  -     MRI Lumbar Spine With & Without Contrast  -     gabapentin (NEURONTIN) 300 MG capsule; One tab at night x 3 days, then one tab BID, then one tab TID.  Dispense: 90 capsule; Refill: 0    2. Disc degeneration, lumbar  -     MRI Lumbar Spine With & Without Contrast  -     gabapentin (NEURONTIN) 300 MG capsule; One tab at night x 3 days, then one tab BID, then one tab TID.  Dispense: 90 capsule; Refill: 0    3. Urinary urgency  -     MRI Lumbar Spine With & Without Contrast  -     gabapentin (NEURONTIN) 300 MG capsule; One tab at night x 3 days, then one tab BID, then one tab TID.  Dispense: 90 capsule; Refill: 0                        BMI is within normal parameters. No other follow-up for BMI required.         Glo Kidd PA-C  Patient Care Team:  Luci Sanchez APRN as PCP - General (Nurse Practitioner)

## 2023-04-25 DIAGNOSIS — M51.36 DISC DEGENERATION, LUMBAR: ICD-10-CM

## 2023-04-25 DIAGNOSIS — R39.15 URINARY URGENCY: ICD-10-CM

## 2023-04-25 DIAGNOSIS — M54.16 LUMBAR RADICULOPATHY: ICD-10-CM

## 2023-04-25 RX ORDER — GABAPENTIN 300 MG/1
CAPSULE ORAL
Qty: 90 CAPSULE | Refills: 0 | Status: SHIPPED | OUTPATIENT
Start: 2023-04-25

## 2023-04-25 NOTE — TELEPHONE ENCOUNTER
Provider:  Sly  Surgery/Procedure:  R L3-4 DISC   Surgery/Procedure Date:  11/11/21  Last visit:   3/28/23  Next visit: 5/10/23     Reason for call:  Requested Prescriptions     Pending Prescriptions Disp Refills   • gabapentin (NEURONTIN) 300 MG capsule [Pharmacy Med Name: GABAPENTIN 300 MG CAPSULE] 90 capsule      Sig: TAKE ONE CAPSULE BY MOUTH ONCE NIGHTLY FOR 3 DAYS, THEN TAKE ONE CAPSULE BY MOUTH TWICE A DAY FOR 3 DAYS, THEN TAKE ONE CAPSULE BY MOUTH THREE TIMES A DAY THEREAFTER       AIDEN:  03/29/2023 [id number] Gabapentin 300MG [facility]

## 2023-05-03 ENCOUNTER — HOSPITAL ENCOUNTER (OUTPATIENT)
Dept: MRI IMAGING | Facility: HOSPITAL | Age: 33
Discharge: HOME | End: 2023-05-03
Admitting: PHYSICIAN ASSISTANT
Payer: COMMERCIAL

## 2023-05-03 PROCEDURE — A9577 INJ MULTIHANCE: HCPCS | Performed by: PHYSICIAN ASSISTANT

## 2023-05-03 PROCEDURE — 72158 MRI LUMBAR SPINE W/O & W/DYE: CPT

## 2023-05-03 PROCEDURE — 0 GADOBENATE DIMEGLUMINE 529 MG/ML SOLUTION: Performed by: PHYSICIAN ASSISTANT

## 2023-05-03 RX ADMIN — GADOBENATE DIMEGLUMINE 15 ML: 529 INJECTION, SOLUTION INTRAVENOUS at 09:53

## 2023-05-10 ENCOUNTER — OFFICE VISIT (OUTPATIENT)
Dept: NEUROSURGERY | Facility: CLINIC | Age: 33
End: 2023-05-10
Payer: COMMERCIAL

## 2023-05-10 VITALS — TEMPERATURE: 97.1 F | RESPIRATION RATE: 18 BRPM | BODY MASS INDEX: 23.62 KG/M2 | HEIGHT: 70 IN | WEIGHT: 165 LBS

## 2023-05-10 DIAGNOSIS — M51.36 DISC DEGENERATION, LUMBAR: ICD-10-CM

## 2023-05-10 DIAGNOSIS — Z71.6 ENCOUNTER FOR SMOKING CESSATION COUNSELING: ICD-10-CM

## 2023-05-10 DIAGNOSIS — M54.16 LUMBAR RADICULOPATHY: Primary | ICD-10-CM

## 2023-05-10 PROBLEM — Z79.02 LONG TERM (CURRENT) USE OF ANTITHROMBOTICS/ANTIPLATELETS: Status: ACTIVE | Noted: 2023-05-03

## 2023-05-10 PROBLEM — I50.42 CHRONIC COMBINED SYSTOLIC AND DIASTOLIC HEART FAILURE: Status: ACTIVE | Noted: 2023-05-03

## 2023-05-10 PROBLEM — Z72.0 TOBACCO ABUSE: Status: ACTIVE | Noted: 2023-05-03

## 2023-05-10 PROBLEM — I25.10 ATHEROSCLEROSIS OF NATIVE CORONARY ARTERY OF NATIVE HEART WITHOUT ANGINA PECTORIS: Status: ACTIVE | Noted: 2023-05-03

## 2023-05-10 PROCEDURE — 1159F MED LIST DOCD IN RCRD: CPT | Performed by: PHYSICIAN ASSISTANT

## 2023-05-10 PROCEDURE — 1160F RVW MEDS BY RX/DR IN RCRD: CPT | Performed by: PHYSICIAN ASSISTANT

## 2023-05-10 PROCEDURE — 99214 OFFICE O/P EST MOD 30 MIN: CPT | Performed by: PHYSICIAN ASSISTANT

## 2023-05-10 RX ORDER — ASPIRIN 81 MG
TABLET,CHEWABLE ORAL
COMMUNITY
Start: 2023-04-30

## 2023-05-10 NOTE — PROGRESS NOTES
Patient: Eddie Lindsay  : 1990  Chart #: [id number]    Date of Service: 05/10/2023    CHIEF COMPLAINT: Right leg weakness and urinary urgency    History of Present Illness Mr. Lindsay is seen in follow-up.  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  Ultimately on 2021 he underwent an uncomplicated right L3-4 extraforaminal discectomy.  He had good relief of his pain although has had ongoing numbness involving his leg and foot.  More recently he presented with increasing back pain radiating into his right buttock, and moves into the anterior thigh to the knee. Typically does not extend beyond the knee.  He states that his right knee occasionally rhea and his partner notices some balance issues but he has not had any falls.  He also notes urinary urgency needing to go every 30-60 minutes. Pain has been so bad he has also had some bowel incontinence.  Recently he completed 6 weeks of formal physical therapy with no improvement in symptoms.  He has no prior history of epidural injections and is fearful of trying them.  His main concern today is back pain. He has been evaluated in the emergency room setting recently for this.     Patient also tells me since his last visit he suffered an MI involving the LAD s/p stent.  He quit smoking.     Past Medical History:   Diagnosis Date   • Anxiety and depression    • Asthma    • Asthma    • Bipolar 2 disorder    • Body piercing     tongue and lip   • Heart murmur    • Hernia of testicle    • History of concussion     middle school football injury   • Hypertension    • Low back pain    • Migraines    • Panic attack    • Syncope and collapse    • Tattoos    • Wears glasses     driving         Current Outpatient Medications:   •  albuterol (PROVENTIL) (2.5 MG/3ML) 0.083% nebulizer solution, , Disp: , Rfl:   •  Aspirin Low Dose 81 MG chewable tablet, , Disp: , Rfl:   •   buPROPion SR (WELLBUTRIN SR) 150 MG 12 hr tablet, , Disp: , Rfl:   •  cetirizine (zyrTEC) 10 MG tablet, Take 1 tablet by mouth Daily., Disp: , Rfl:   •  Flovent  MCG/ACT inhaler, , Disp: , Rfl:   •  fluticasone (FLONASE) 50 MCG/ACT nasal spray, USE 1-2 SPRAYS PER NOSTRIL DAILY., Disp: , Rfl:   •  gabapentin (NEURONTIN) 300 MG capsule, 1 tab PO TID, Disp: 90 capsule, Rfl: 0  •  ondansetron (ZOFRAN) 4 MG tablet, Take 1 tablet by mouth Every 8 (Eight) Hours As Needed for Nausea or Vomiting., Disp: 15 tablet, Rfl: 0  •  SUMAtriptan (IMITREX) 100 MG tablet, , Disp: , Rfl:   •  traZODone (DESYREL) 50 MG tablet, , Disp: , Rfl:   •  triamcinolone (KENALOG) 0.1 % cream, , Disp: , Rfl:   •  valACYclovir (VALTREX) 500 MG tablet, , Disp: , Rfl:     Past Surgical History:   Procedure Laterality Date   • DENTAL PROCEDURE      wisdom teeth, all 4 removed   • HERNIA REPAIR     • LUMBAR DISCECTOMY Right 2021    Procedure: right L3-4 extraforaminal discectomy;  Surgeon: Fredy Heart MD;  Location: [facility];  Service: Neurosurgery;  Laterality: Right;       Social History     Socioeconomic History   • Marital status: Single   Tobacco Use   • Smoking status: Former     Packs/day: 0.25     Types: Cigarettes     Quit date: 2023     Years since quittin.1   • Smokeless tobacco: Never   • Tobacco comments:     0.25 or less per day   Vaping Use   • Vaping Use: Some days   • Substances: Nicotine, Flavoring   • Devices: Disposable   Substance and Sexual Activity   • Alcohol use: No   • Drug use: No   • Sexual activity: Yes     Partners: Female         Review of Systems   Constitutional: Positive for unexpected weight change. Negative for activity change, appetite change, chills and fever.   HENT: Negative for facial swelling, hearing loss, tinnitus, trouble swallowing and voice change.    Eyes: Negative for pain and visual disturbance.   Respiratory: Negative for apnea and shortness of breath.    Cardiovascular:  "Negative for leg swelling.   Gastrointestinal: Negative for constipation, nausea and vomiting.   Genitourinary: Positive for urgency. Negative for difficulty urinating and dysuria.   Musculoskeletal: Positive for back pain and gait problem. Negative for joint swelling, neck pain and neck stiffness.   Skin: Negative for color change.   Neurological: Positive for numbness. Negative for dizziness, facial asymmetry, speech difficulty and weakness.   Psychiatric/Behavioral: Negative for behavioral problems, confusion, dysphoric mood and sleep disturbance. The patient is not nervous/anxious.        Objective   Vital Signs: Temperature 97.1 °F (36.2 °C), temperature source Infrared, resp. rate 18, height 177.8 cm (70\"), weight 74.8 kg (165 lb).  Physical Exam  Vitals and nursing note reviewed.   Constitutional:       General: He is not in acute distress.     Appearance: He is well-developed.   HENT:      Head: Normocephalic and atraumatic.   Pulmonary:      Breath sounds: Normal breath sounds.   Psychiatric:         Behavior: Behavior normal.         Thought Content: Thought content normal.     Musculoskeletal:     Strength is intact in upper and lower extremities to direct testing.     Station and gait are normal.     Straight leg raising is negative.   Neurologic:     Muscle tone is normal throughout.     Coordination is intact.     Sensation is intact to light touch throughout.     Patient is oriented to person, place, and time.         Independent review of radiographic imaging: MRI lumbar spine dated 5/3/2023 demonstrates a focal degenerative disc at L3-4.  There is some narrowing in the right foramen that enhances on post shireen sequences consistent with scar.  No overt recurrent disc herniation.  He has facet arthropathy noted at multiple levels. I do not see anything that explains his bowel or urinary incontinence.  I also had Dr. Heart review images.    Electrodiagnostic studies demonstrate chronic L3-4 " radiculopathy on the right    Hemoglobin A1c 5.4    Assessment & Plan   Diagnosis:  1.  Chronic back pain  2.  Chronic lumbar radiculopathy status post discectomy L3-4, right.    Medical Decision Making: At this juncture, patient's pain is mostly axial. Gabapentin seems to be helping with the leg symptoms. Surgical exploration of the L3-4 would have very low yield. I discussed the merits of seeing pain management for evaluation of injections. He is very fearful of needles and really does not want to go that route. He feels like he should just file for disability although I do not see anything that would preclude him from some gainful employment as far as his back goes. He is going to think about the injections. I will see him back in a few months to check on his medications. I will manage his Neurontin in the interim.          Diagnoses and all orders for this visit:    1. Lumbar radiculopathy (Primary)  -     Ambulatory Referral to Pain Management    2. Disc degeneration, lumbar  -     Ambulatory Referral to Pain Management    3. Encounter for smoking cessation counseling                        BMI is within normal parameters. No other follow-up for BMI required.         Glo Kidd PA-C  Patient Care Team:  Luci Sanchez APRN as PCP - General (Nurse Practitioner)

## 2023-08-08 ENCOUNTER — OFFICE VISIT (OUTPATIENT)
Dept: NEUROSURGERY | Facility: CLINIC | Age: 33
End: 2023-08-08
Payer: COMMERCIAL

## 2023-08-08 VITALS
SYSTOLIC BLOOD PRESSURE: 108 MMHG | BODY MASS INDEX: 23.79 KG/M2 | HEIGHT: 70 IN | WEIGHT: 166.2 LBS | DIASTOLIC BLOOD PRESSURE: 70 MMHG

## 2023-08-08 DIAGNOSIS — Z71.6 ENCOUNTER FOR SMOKING CESSATION COUNSELING: ICD-10-CM

## 2023-08-08 DIAGNOSIS — M54.16 LUMBAR RADICULOPATHY: Primary | ICD-10-CM

## 2023-08-08 DIAGNOSIS — M51.36 DISC DEGENERATION, LUMBAR: ICD-10-CM

## 2023-08-08 PROCEDURE — 3074F SYST BP LT 130 MM HG: CPT | Performed by: PHYSICIAN ASSISTANT

## 2023-08-08 PROCEDURE — 3078F DIAST BP <80 MM HG: CPT | Performed by: PHYSICIAN ASSISTANT

## 2023-08-08 PROCEDURE — 99213 OFFICE O/P EST LOW 20 MIN: CPT | Performed by: PHYSICIAN ASSISTANT

## 2023-08-08 RX ORDER — LISINOPRIL 20 MG/1
20 TABLET ORAL DAILY
COMMUNITY

## 2023-08-08 RX ORDER — ROSUVASTATIN CALCIUM 40 MG/1
40 TABLET, COATED ORAL DAILY
COMMUNITY

## 2023-08-08 RX ORDER — SPIRONOLACTONE 25 MG/1
12.5 TABLET ORAL DAILY
COMMUNITY

## 2023-08-08 RX ORDER — NITROGLYCERIN 0.4 MG/1
TABLET SUBLINGUAL
COMMUNITY
Start: 2023-05-17

## 2023-08-08 RX ORDER — PSEUDOEPHED/ACETAMINOPH/DIPHEN 30MG-500MG
TABLET ORAL
COMMUNITY
Start: 2023-07-17

## 2023-08-08 RX ORDER — UBROGEPANT 50 MG/1
TABLET ORAL
COMMUNITY
Start: 2023-07-17

## 2023-08-08 RX ORDER — METOPROLOL SUCCINATE 25 MG/1
12.5 TABLET, EXTENDED RELEASE ORAL DAILY
COMMUNITY

## 2023-08-08 NOTE — PROGRESS NOTES
Patient: Eddie Lindsay  : 1990  Chart #: [id number]    Date of Service: 2023    CHIEF COMPLAINT: Right leg weakness and urinary urgency    History of Present Illness Mr. Lindsay is seen in follow-up.  He is a 33-year-old unemployed gentleman who presented to our clinic with back and right leg pain as well as sensory alteration.  Ultimately on 2021 he underwent an uncomplicated right L3-4 extraforaminal discectomy.  He had good relief of his pain although has had ongoing numbness involving his leg and foot.  More recently he presented with increasing back pain radiating into his right buttock, and moves into the anterior thigh to the knee.  MRI demonstrated some scar tissue but no overt recurrent disc herniation.  He was referred for an epidural injection however he is terrified of needles and is not interested in going that route.  Today he says he is doing some better.  His leg pain is not as noticeable.  His back is feeling better.  His significant other attributes this to him no longer working at [employer].        Past Medical History:   Diagnosis Date    Anxiety and depression     Asthma     Asthma     Bipolar 2 disorder     Body piercing     tongue and lip    Coronary artery disease 2023    Heart attack 2023    Heart murmur     Hernia of testicle     History of concussion     middle school football injury    Hypertension     Low back pain     Lumbosacral disc disease     Migraines     Panic attack     Syncope and collapse     Tattoos     Wears glasses     driving         Current Outpatient Medications:     Acetaminophen Extra Strength 500 MG tablet, , Disp: , Rfl:     albuterol (PROVENTIL) (2.5 MG/3ML) 0.083% nebulizer solution, , Disp: , Rfl:     Aspirin Low Dose 81 MG chewable tablet, , Disp: , Rfl:     buPROPion SR (WELLBUTRIN SR) 150 MG 12 hr tablet, , Disp: , Rfl:     cetirizine (zyrTEC) 10 MG tablet, Take 1 tablet by mouth Daily., Disp: , Rfl:     Flovent  MCG/ACT  inhaler, , Disp: , Rfl:     fluticasone (FLONASE) 50 MCG/ACT nasal spray, USE 1-2 SPRAYS PER NOSTRIL DAILY., Disp: , Rfl:     gabapentin (NEURONTIN) 300 MG capsule, 1 tab PO TID, Disp: 90 capsule, Rfl: 0    lisinopril (PRINIVIL,ZESTRIL) 20 MG tablet, Take 1 tablet by mouth Daily., Disp: , Rfl:     metoprolol succinate XL (TOPROL-XL) 25 MG 24 hr tablet, Take 0.5 tablets by mouth Daily., Disp: , Rfl:     nitroglycerin (NITROSTAT) 0.4 MG SL tablet, , Disp: , Rfl:     ondansetron (ZOFRAN) 4 MG tablet, Take 1 tablet by mouth Every 8 (Eight) Hours As Needed for Nausea or Vomiting., Disp: 15 tablet, Rfl: 0    rosuvastatin (CRESTOR) 40 MG tablet, Take 1 tablet by mouth Daily., Disp: , Rfl:     spironolactone (ALDACTONE) 25 MG tablet, Take 0.5 tablets by mouth Daily., Disp: , Rfl:     traZODone (DESYREL) 50 MG tablet, , Disp: , Rfl:     triamcinolone (KENALOG) 0.1 % cream, , Disp: , Rfl:     Ubrelvy tablet, , Disp: , Rfl:     valACYclovir (VALTREX) 500 MG tablet, , Disp: , Rfl:     Past Surgical History:   Procedure Laterality Date    DENTAL PROCEDURE      wisdom teeth, all 4 removed    HERNIA REPAIR      LUMBAR DISCECTOMY Right 2021    Procedure: right L3-4 extraforaminal discectomy;  Surgeon: Fredy Heart MD;  Location: [facility];  Service: Neurosurgery;  Laterality: Right;       Social History     Socioeconomic History    Marital status: Single   Tobacco Use    Smoking status: Every Day     Packs/day: 0.50     Types: Cigarettes     Last attempt to quit: 2023     Years since quittin.3    Smokeless tobacco: Never    Tobacco comments:     0.25 or less per day   Vaping Use    Vaping Use: Some days    Substances: Nicotine, Flavoring    Devices: Disposable   Substance and Sexual Activity    Alcohol use: No    Drug use: No    Sexual activity: Yes     Partners: Female         Review of Systems   Constitutional:  Positive for unexpected weight change. Negative for activity change, appetite change, chills and  "fever.   HENT:  Negative for facial swelling, hearing loss, tinnitus, trouble swallowing and voice change.    Eyes:  Negative for pain and visual disturbance.   Respiratory:  Negative for apnea and shortness of breath.    Cardiovascular:  Negative for leg swelling.   Gastrointestinal:  Negative for constipation, nausea and vomiting.   Genitourinary:  Positive for urgency. Negative for difficulty urinating and dysuria.   Musculoskeletal:  Positive for back pain and gait problem. Negative for joint swelling, neck pain and neck stiffness.   Skin:  Negative for color change.   Neurological:  Positive for numbness. Negative for dizziness, facial asymmetry, speech difficulty and weakness.   Psychiatric/Behavioral:  Negative for behavioral problems, confusion, dysphoric mood and sleep disturbance. The patient is not nervous/anxious.      Objective   Vital Signs: Blood pressure 108/70, height 177.8 cm (70\"), weight 75.4 kg (166 lb 3.2 oz).  Physical Exam  Vitals and nursing note reviewed.   Constitutional:       General: He is not in acute distress.     Appearance: He is well-developed.   HENT:      Head: Normocephalic and atraumatic.   Pulmonary:      Breath sounds: Normal breath sounds.   Psychiatric:         Behavior: Behavior normal.         Thought Content: Thought content normal.   Musculoskeletal:     Strength is intact in upper and lower extremities to direct testing.     Station and gait are normal.  Neurologic:     Muscle tone is normal throughout.     Coordination is intact.         Independent review of radiographic imaging: MRI lumbar spine dated 5/3/2023 demonstrates a focal degenerative disc at L3-4.  There is some narrowing in the right foramen that enhances on post shireen sequences consistent with scar.  No overt recurrent disc herniation.  He has facet arthropathy noted at multiple levels. I do not see anything that explains his bowel or urinary incontinence.  I also had Dr. Heart review " images.    Electrodiagnostic studies demonstrate chronic L3-4 radiculopathy on the right    Hemoglobin A1c 5.4    Assessment & Plan   Diagnosis:  1.  Chronic back pain  2.  Chronic lumbar radiculopathy status post discectomy L3-4, right.    Medical Decision Making: Patient symptoms seem to be well-controlled at this point.  Leg pain is slowly improving.  He is not interested in undergoing any kind of epidural injections.  I will be happy to see him on an as-needed basis      Diagnoses and all orders for this visit:    1. Lumbar radiculopathy (Primary)    2. Disc degeneration, lumbar    3. Encounter for smoking cessation counseling                          BMI is within normal parameters. No other follow-up for BMI required.         Glo Kidd PA-C  Patient Care Team:  Jameel Jean MD as PCP - General (Family Medicine)  Jorge Luis Matamoros MD as Consulting Physician (Pain Medicine)

## 2023-10-18 DIAGNOSIS — M51.36 DISC DEGENERATION, LUMBAR: ICD-10-CM

## 2023-10-18 DIAGNOSIS — R39.15 URINARY URGENCY: ICD-10-CM

## 2023-10-18 DIAGNOSIS — M54.16 LUMBAR RADICULOPATHY: ICD-10-CM

## 2023-10-19 NOTE — TELEPHONE ENCOUNTER
Provider:  Bernabe  Surgery/Procedure:  Right L3-4 extraforaminal discectomy  Surgery/Procedure Date:  11/11/21  Last visit:   8/8/23  Next visit: NA     Reason for call:  Refill request for gabapentin pending. We have not filled this since April so I left patient a VM to call back to see if he is still taking this medication or if this was an automated request from his pharmacy.    Elvin:    Pat ID Date Filled RX # Drug Name Prescriber Name Dispenser Name Qty Days MED PMT Rpt To  1 05/08/2023 [id number] Hydrocodone Bitartrate/Ac 325MG/5MG Christine Pacheco [facility] 12 3 20 04 [place]  Date Written New/Refill Dosage Form Prescriber [facility]  05/08/2023 [facility]  Pat ID Date Filled RX # Drug Name Prescriber Name Dispenser Name Qty Days MED PMT Rpt To  1 04/26/2023 [id number] Gabapentin 300MG Edmar Sinclair [facility] 90 30 04 [place]  Date Written New/Refill Dosage Form Prescriber [facility]  04/25/2023 [facility]  Pat ID Date Filled RX # Drug Name Prescriber Name Dispenser Name Qty Days MED PMT Rpt To  1 03/29/2023 [id number] Gabapentin 300MG Glo Kidd [facility] 90 30 04 [place]  Date Written New/Refill Dosage Form Prescriber [facility]  03/28/2023 [facility]

## 2023-10-20 RX ORDER — GABAPENTIN 300 MG/1
CAPSULE ORAL
Qty: 30 CAPSULE | Refills: 1 | Status: SHIPPED | OUTPATIENT
Start: 2023-10-20

## 2023-12-21 DIAGNOSIS — M54.16 LUMBAR RADICULOPATHY: ICD-10-CM

## 2023-12-21 DIAGNOSIS — R39.15 URINARY URGENCY: ICD-10-CM

## 2023-12-21 DIAGNOSIS — M51.36 DISC DEGENERATION, LUMBAR: ICD-10-CM

## 2023-12-21 RX ORDER — GABAPENTIN 300 MG/1
CAPSULE ORAL
Qty: 30 CAPSULE | OUTPATIENT
Start: 2023-12-21

## 2023-12-26 DIAGNOSIS — R39.15 URINARY URGENCY: ICD-10-CM

## 2023-12-26 DIAGNOSIS — M54.16 LUMBAR RADICULOPATHY: ICD-10-CM

## 2023-12-26 DIAGNOSIS — M51.36 DISC DEGENERATION, LUMBAR: ICD-10-CM

## 2023-12-26 RX ORDER — GABAPENTIN 300 MG/1
CAPSULE ORAL
Qty: 30 CAPSULE | Refills: 1 | Status: CANCELLED | OUTPATIENT
Start: 2023-12-26

## 2023-12-26 RX ORDER — GABAPENTIN 300 MG/1
CAPSULE ORAL
Qty: 30 CAPSULE | Refills: 1 | OUTPATIENT
Start: 2023-12-26

## 2023-12-26 NOTE — TELEPHONE ENCOUNTER
Refill request  Requested Prescriptions     Pending Prescriptions Disp Refills    gabapentin (NEURONTIN) 300 MG capsule 30 capsule 1     Si tab PO daily       Provider:  Dr. Heart  Surgery/Procedure:  right L3-4 extraforaminal discectomy   Surgery/Procedure Date:  Surgery with Fredy Heart MD (2021)   Last visit:   Office Visit with Glo Kidd PA-C (2023)   Next visit: MADE AUGUSTINE WOLFE    2023 [id number] Gabapentin 300MG [facility] 30 30 04 [place]  Date Written New/Refill Dosage Form Prescriber [facility]  10/20/2023 Refill [facility]  Pat ID Date Filled RX # Drug Name Prescriber Name Dispenser Name Qty Days MED PMT Rpt To  1 10/20/2023 [id number] Gabapentin 300MG [facility] 30 30 04 [place]  Date Written New/Refill Dosage Form Prescriber [facility]  10/20/2023 New [facility]  Pat ID Date Filled RX # Drug Name Prescriber Name Dispenser Name Qty Days MED PMT Rpt To  1 2023 [id number] Hydrocodone Bitartrate/Ac 325MG/5MG Christine Pacheco [facility] 12 3 20 04 [place]  Date Written New/Refill Dosage Form Prescriber [facility]  2023 New [facility]  Pat ID Date Filled RX # Drug Name Prescriber Name Dispenser Name Qty Days MED PMT Rpt To  1 2023 [id number] Gabapentin 300MG Edmar Sinclair [facility] 90 30 04 [place]  Date Written New/Refill Dosage Form Prescriber [facility]  2023 [facility]  Pat ID Date Filled RX # Drug Name Prescriber Name Dispenser Name Qty Days MED PMT Rpt To  2023 [id number] Gabapentin 300MG [facility] 90 30 04

## 2024-04-15 ENCOUNTER — CONSULT (OUTPATIENT)
Dept: ONCOLOGY | Facility: CLINIC | Age: 34
End: 2024-04-15
Payer: COMMERCIAL

## 2024-04-15 VITALS
HEART RATE: 67 BPM | OXYGEN SATURATION: 96 % | HEIGHT: 70 IN | SYSTOLIC BLOOD PRESSURE: 121 MMHG | WEIGHT: 205 LBS | DIASTOLIC BLOOD PRESSURE: 80 MMHG | BODY MASS INDEX: 29.35 KG/M2 | TEMPERATURE: 98.1 F | RESPIRATION RATE: 16 BRPM

## 2024-04-15 DIAGNOSIS — R04.0 EPISTAXIS, RECURRENT: Primary | ICD-10-CM

## 2024-04-15 PROCEDURE — 3079F DIAST BP 80-89 MM HG: CPT | Performed by: INTERNAL MEDICINE

## 2024-04-15 PROCEDURE — 1160F RVW MEDS BY RX/DR IN RCRD: CPT | Performed by: INTERNAL MEDICINE

## 2024-04-15 PROCEDURE — 99205 OFFICE O/P NEW HI 60 MIN: CPT | Performed by: INTERNAL MEDICINE

## 2024-04-15 PROCEDURE — 1159F MED LIST DOCD IN RCRD: CPT | Performed by: INTERNAL MEDICINE

## 2024-04-15 PROCEDURE — 3074F SYST BP LT 130 MM HG: CPT | Performed by: INTERNAL MEDICINE

## 2024-04-15 PROCEDURE — 1126F AMNT PAIN NOTED NONE PRSNT: CPT | Performed by: INTERNAL MEDICINE

## 2024-04-15 RX ORDER — LOSARTAN POTASSIUM 25 MG/1
25 TABLET ORAL DAILY
COMMUNITY
Start: 2024-02-05

## 2024-04-15 RX ORDER — RANOLAZINE 500 MG/1
1 TABLET, EXTENDED RELEASE ORAL 2 TIMES DAILY
COMMUNITY
Start: 2024-02-14

## 2024-04-15 RX ORDER — OMEPRAZOLE 20 MG/1
20 CAPSULE, DELAYED RELEASE ORAL DAILY
COMMUNITY
Start: 2024-02-27

## 2024-04-15 RX ORDER — RISPERIDONE 1 MG/1
1 TABLET ORAL 2 TIMES DAILY
COMMUNITY

## 2024-04-15 RX ORDER — PRASUGREL 10 MG/1
10 TABLET, FILM COATED ORAL DAILY
COMMUNITY
Start: 2023-12-26

## 2024-04-15 RX ORDER — ONDANSETRON 4 MG/1
4 TABLET, ORALLY DISINTEGRATING ORAL TAKE AS DIRECTED
COMMUNITY
Start: 2023-12-27

## 2024-04-15 RX ORDER — BENZTROPINE MESYLATE 1 MG/1
1 TABLET ORAL 2 TIMES DAILY
COMMUNITY
Start: 2024-01-14

## 2024-04-15 NOTE — LETTER
April 15, 2024       No Recipients    Patient: Eddie Lindsay   YOB: 1990   Date of Visit: 4/15/2024     Dear Jameel Jean MD:       Thank you for referring Eddie Lindsay to me for evaluation. Below are the relevant portions of my assessment and plan of care.    If you have questions, please do not hesitate to call me. I look forward to following Eddie along with you.         Sincerely,        Billy Cabral MD        CC:   No Recipients    Billy Cabral MD  04/15/24 1153  Sign when Signing Visit  CHIEF COMPLAINT: Family history of clotting    REASON FOR REFERRAL: Daughter with purported factor VII heterozygous mutation      RECORDS OBTAINED  Records of the patients history including those obtained from records from [facility] as well as [facility] were reviewed and summarized in detail.    Oncology/Hematology History Overview Note   1. 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  NSTEMI on aspirin with CHF  3.  Occasional nosebleeds  4.  Sleep apnea  5.  Migraines  6.  Hypertension  7.  Hyperlipidemia    Hematology history timeline:  -5/7/2020 whole exome sequencing genetics at [facility]  -4/28/2021 factor VII quantitation normal 69.6 with range of normal %.  INR 1.05.  Normal protein C activity.  Telephone note from [facility] Dr. Jeffrey Ratliff states that the patient's daughter had hypercoagulable workup due to her cavernous transformation of portal vein and was significant for borderline low protein C and mild factor VII deficiency.  Patient had normal factor VII quantitation and normal INR 1.05 with no abnormalities of protein C activity.  -4/28/2023 PTT normal 27 lower limit of normal 25 upper limit of normal 35.  -5/15/2023 peripheral smear showed normal CBC and differential with a few reactive lymphocytes otherwise unremarkable morphology per hematopathology  "review at [facility]  -9/6/2023 hematology consult Dr. Dewey Ríos.  Being seen for concerns for coagulopathy.  Notes that he had undergone testing at [facility] a few years ago and was told he was a\" carrier for factor VII\".  This was undertaken after his daughter had hemostasis issues.  She now requires factor replacement prior to any procedures.  Patient had not had any bleeding issues including cardiac procedures with stent placement, lumbar back surgery, wisdom teeth removal.  In fact he has early onset MI with congestive heart failure which requires antiplatelet therapy without significant signs or symptoms of bleeding.  Slightly low platelets on occasion.  Girlfriend noted that occasionally the CBC comments \"immature granulocytes\" noted on [facility] labs.  Dr. Ríos stated he would like to obtain his records from [facility] but given the clinical history there does not appear to be any concern for risks of problems with hemostasis nor particular need for precautions.  Thought the periodic mild thrombocytopenia might be medication related.  -2/27/2024 white count 7000 hemoglobin 14.9 platelets 157,000 with unremarkable differential.    -4/15/2024 [facility] hematology consult: Patient comes to me for second opinion regarding his periodic epistaxis, mild thrombocytopenia that waxes and wanes with a purported history of being factor VII heterozygous deficient but with normal factor VII activity and normal PT and PTT.  This diagnosis was found on workup of his daughter who on whole genome sequencing was found to have this factor VII heterozygous deficiency coming from her father, my patient.  He himself has had neither significant bleedingHistory of back surgery, cardiac surgery and discogram with no bleeding nor any postoperative clotting from any of those.  The fact that he is having mild epistaxis periodically in the midst of winter on aspirin is not entirely mysterious but " I would defer to ENT as to whether there could be polyps or other issues.  He does have seasonal allergies now compounding all of this.  People with heterozygous factor VII deficiency, unlike hemophilia A or hemophilia B, are not X-linked and generally only symptomatic when homozygous though there are reports of heterozygous patient's who have both bleeding and/or clotting tendencies.  In patients with factor VII activity over 35% such as his being in the over 60% range, risk of bleeding should be minimal and generally we would not recommend factor replacement particularly when there is some thrombotic risk from this deficiency ironically albeit mild and I am not sure if that is what caused his daughters portal vein thrombosis or not as I have not seen her.  Should he have significant bleeding and there be thought about factor VII replacement which is not a common therapy given the rarity of this condition, I would suggest referral to the hemophilia clinic at  that actually is run by a nurse practitioner there who coordinates the care of their child and adolescent and then into adulthood hemophilia care.  At this junction I would continue his aspirin and in general principles it is better to risk a bleed to prevent further clot particularly in the precocious coronary disease such as this patient.  I will see on an as-needed basis.  As to comments on past slides of immature granulocytes, he has had peripheral smears that just basically showed reactive lymphocytes changes and he has a multiplicity of explanations for that and the odds in light of his multiplicity of essentially normal CBCs and differentials of there being some latent malignant leukemic process is essentially 0 given the longevity of this.  I will see on an as-needed basis.     Epistaxis, recurrent       HISTORY OF PRESENT ILLNESS:  The patient is a 33 y.o.  male, referred for evaluation of management of epistaxis with normal PTT, normal PTT, family  history of portal vein thrombosis in a daughter who has simultaneous low protein C and protein S purportedly due to her liver disease but this patient having been through multiple traumas and surgeries throughout life without bleeding excessively nor post surgical thrombosis.  The daughter reportedly has factor VII heterozygous deficiency    REVIEW OF SYSTEMS:  No somatic complaints other than occasional nosebleeds but these are happening ever since he started the aspirin for his coronary disease    Past Medical History:   Diagnosis Date   • Anxiety and depression    • Asthma    • Asthma    • Bipolar 2 disorder    • Body piercing     tongue and lip   • Coronary artery disease 04/28/2023   • Heart attack 04/28/2023   • Heart murmur    • Hernia of testicle    • History of concussion     middle school football injury   • Hypertension    • Low back pain    • Lumbosacral disc disease    • Migraines    • Panic attack    • Syncope and collapse 2008   • Tattoos    • Wears glasses     driving     Past Surgical History:   Procedure Laterality Date   • DENTAL PROCEDURE      wisdom teeth, all 4 removed   • HERNIA REPAIR     • LUMBAR DISCECTOMY Right 11/11/2021    Procedure: right L3-4 extraforaminal discectomy;  Surgeon: Fredy Heart MD;  Location: [facility];  Service: Neurosurgery;  Laterality: Right;       Current Outpatient Medications on File Prior to Visit   Medication Sig Dispense Refill   • Acetaminophen Extra Strength 500 MG tablet      • albuterol (PROVENTIL) (2.5 MG/3ML) 0.083% nebulizer solution      • Aspirin Low Dose 81 MG chewable tablet      • benztropine (COGENTIN) 1 MG tablet Take 1 tablet by mouth 2 (Two) Times a Day.     • buPROPion SR (WELLBUTRIN SR) 150 MG 12 hr tablet      • cetirizine (zyrTEC) 10 MG tablet Take 1 tablet by mouth Daily.     • empagliflozin (JARDIANCE) 10 MG tablet tablet Take 1 tablet by mouth Daily.     • Flovent  MCG/ACT inhaler      • fluticasone (FLONASE) 50 MCG/ACT nasal  spray USE 1-2 SPRAYS PER NOSTRIL DAILY.     • gabapentin (NEURONTIN) 300 MG capsule 1 tab PO daily 30 capsule 1   • lisinopril (PRINIVIL,ZESTRIL) 20 MG tablet Take 1 tablet by mouth Daily.     • losartan (COZAAR) 25 MG tablet Take 1 tablet by mouth Daily.     • metoprolol succinate XL (TOPROL-XL) 25 MG 24 hr tablet Take 0.5 tablets by mouth Daily.     • nitroglycerin (NITROSTAT) 0.4 MG SL tablet      • omeprazole (priLOSEC) 20 MG capsule Take 1 capsule by mouth Daily.     • ondansetron (ZOFRAN) 4 MG tablet Take 1 tablet by mouth Every 8 (Eight) Hours As Needed for Nausea or Vomiting. 15 tablet 0   • ondansetron ODT (ZOFRAN-ODT) 4 MG disintegrating tablet Place 1 tablet on the tongue Take As Directed. USE 4-8 MG TWICE DAILY AS NEEDED FOR NAUSEA WITH MIGRAINE.     • prasugrel (EFFIENT) 10 MG tablet Take 1 tablet by mouth Daily.     • ranolazine (RANEXA) 500 MG 12 hr tablet Take 1 tablet by mouth 2 (Two) Times a Day.     • risperiDONE (risperDAL) 1 MG tablet Take 1 tablet by mouth 2 (Two) Times a Day.     • rosuvastatin (CRESTOR) 40 MG tablet Take 1 tablet by mouth Daily.     • spironolactone (ALDACTONE) 25 MG tablet Take 0.5 tablets by mouth Daily.     • traZODone (DESYREL) 50 MG tablet      • triamcinolone (KENALOG) 0.1 % cream      • Ubrelvy tablet      • valACYclovir (VALTREX) 500 MG tablet        No current facility-administered medications on file prior to visit.       No Known Allergies    Social History     Socioeconomic History   • Marital status: Single   Tobacco Use   • Smoking status: Every Day     Current packs/day: 0.00     Types: Cigarettes     Last attempt to quit: 2023     Years since quittin.0   • Smokeless tobacco: Never   • Tobacco comments:     0.25 or less per day   Vaping Use   • Vaping status: Some Days   • Substances: Nicotine, Flavoring   • Devices: Disposable   Substance and Sexual Activity   • Alcohol use: No   • Drug use: No   • Sexual activity: Yes     Partners: Female       Family  "History   Adopted: Yes       PHYSICAL EXAM:  No petechiae or ecchymoses.  No jaundice icterus or pallor.  No respiratory distress.    /80   Pulse 67   Temp 98.1 °F (36.7 °C)   Resp 16   Ht 177.8 cm (70\")   Wt 93 kg (205 lb)   SpO2 96%   BMI 29.41 kg/m²     ECOG score: 0     Lab Results   Component Value Date    HGB 13.5 (L) 07/17/2023    HCT 38.3 (L) 07/17/2023    MCV 86 07/17/2023     (L) 07/17/2023    WBC 7.47 07/17/2023    NEUTROABS 5.82 05/29/2023    LYMPHSABS 2.44 05/29/2023    MONOSABS 0.74 05/29/2023    EOSABS 0.15 05/29/2023    BASOSABS 0.05 05/29/2023     Lab Results   Component Value Date    GLUCOSE 92 12/15/2022    BUN 8 12/15/2022    CREATININE 0.80 12/15/2022     (L) 04/28/2023    K 3.3 (L) 04/28/2023    CL 98 04/28/2023    CO2 22 04/28/2023    CALCIUM 9.6 12/15/2022    PROTEINTOT 6.6 11/09/2021    ALBUMIN 4.10 11/09/2021    BILITOT 0.4 11/09/2021    ALKPHOS 101 11/09/2021    AST 22 11/09/2021    ALT 30 11/09/2021         Assessment & Plan  1. 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  NSTEMI on aspirin with CHF  3.  Occasional nosebleeds  4.  Sleep apnea  5.  Migraines  6.  Hypertension  7.  Hyperlipidemia    Hematology history timeline:  -5/7/2020 whole exome sequencing genetics at [facility]  -4/28/2021 factor VII quantitation normal 69.6 with range of normal %.  INR 1.05.  Normal protein C activity.  Telephone note from [facility] Dr. Jeffrey Ratliff states that the patient's daughter had hypercoagulable workup due to her cavernous transformation of portal vein and was significant for borderline low protein C and mild factor VII deficiency.  Patient had normal factor VII quantitation and normal INR 1.05 with no abnormalities of protein C activity.  -4/28/2023 PTT normal 27 lower limit of normal 25 upper limit of normal 35.  -5/15/2023 peripheral smear showed normal CBC and differential with a few " "reactive lymphocytes otherwise unremarkable morphology per hematopathology review at [facility]  -9/6/2023 hematology consult Dr. Dewey Ríos.  Being seen for concerns for coagulopathy.  Notes that he had undergone testing at [facility] a few years ago and was told he was a\" carrier for factor VII\".  This was undertaken after his daughter had hemostasis issues.  She now requires factor replacement prior to any procedures.  Patient had not had any bleeding issues including cardiac procedures with stent placement, lumbar back surgery, wisdom teeth removal.  In fact he has early onset MI with congestive heart failure which requires antiplatelet therapy without significant signs or symptoms of bleeding.  Slightly low platelets on occasion.  Girlfriend noted that occasionally the CBC comments \"immature granulocytes\" noted on [facility] labs.  Dr. Ríos stated he would like to obtain his records from [facility] but given the clinical history there does not appear to be any concern for risks of problems with hemostasis nor particular need for precautions.  Thought the periodic mild thrombocytopenia might be medication related.  -2/27/2024 white count 7000 hemoglobin 14.9 platelets 157,000 with unremarkable differential.    -4/15/2024 [facility] hematology consult: Patient comes to me for second opinion regarding his periodic epistaxis, mild thrombocytopenia that waxes and wanes with a purported history of being factor VII heterozygous deficient but with normal factor VII activity and normal PT and PTT.  This diagnosis was found on workup of his daughter who on whole genome sequencing was found to have this factor VII heterozygous deficiency coming from her father, my patient.  He himself has had neither significant bleedingHistory of back surgery, cardiac surgery and discogram with no bleeding nor any postoperative clotting from any of those.  The fact that he is having mild epistaxis " periodically in the midst of winter on aspirin is not entirely mysterious but I would defer to ENT as to whether there could be polyps or other issues.  He does have seasonal allergies now compounding all of this.  People with heterozygous factor VII deficiency, unlike hemophilia A or hemophilia B, are not X-linked and generally only symptomatic when homozygous though there are reports of heterozygous patient's who have both bleeding and/or clotting tendencies.  In patients with factor VII activity over 35% such as his being in the over 60% range, risk of bleeding should be minimal and generally we would not recommend factor replacement particularly when there is some thrombotic risk from this deficiency ironically albeit mild and I am not sure if that is what caused his daughters portal vein thrombosis or not as I have not seen her.  Should he have significant bleeding and there be thought about factor VII replacement which is not a common therapy given the rarity of this condition, I would suggest referral to the hemophilia clinic at  that actually is run by a nurse practitioner there who coordinates the care of their child and adolescent and then into adulthood hemophilia care.  At this junction I would continue his aspirin and in general principles it is better to risk a bleed to prevent further clot particularly in the precocious coronary disease such as this patient.  I will see on an as-needed basis.  As to comments on past slides of immature granulocytes, he has had peripheral smears that just basically showed reactive lymphocytes changes and he has a multiplicity of explanations for that and the odds in light of his multiplicity of essentially normal CBCs and differentials of there being some latent malignant leukemic process is essentially 0 given the longevity of this.  I will see on an as-needed basis.    Total time of care today inclusive of time spent today prior to patient's arrival reviewing past  records and trying to extract that information from outside records from [facility] in [place] as well as  over the past 3 years and during visit interviewing him as to signs or symptoms of his disease and management thereof and after visit instituting the plan as outlined took 1 hour patient care time throughout the day today.      Billy Cabral MD    4/15/2024

## 2024-04-15 NOTE — PROGRESS NOTES
"CHIEF COMPLAINT: Family history of clotting    REASON FOR REFERRAL: Daughter with purported factor VII heterozygous mutation      RECORDS OBTAINED  Records of the patients history including those obtained from records from [facility] as well as [facility] were reviewed and summarized in detail.    Oncology/Hematology History Overview Note   1. 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  NSTEMI on aspirin with CHF  3.  Occasional nosebleeds  4.  Sleep apnea  5.  Migraines  6.  Hypertension  7.  Hyperlipidemia    Hematology history timeline:  -5/7/2020 whole exome sequencing genetics at [facility]  -4/28/2021 factor VII quantitation normal 69.6 with range of normal %.  INR 1.05.  Normal protein C activity.  Telephone note from [facility] Dr. Jeffrey Ratliff states that the patient's daughter had hypercoagulable workup due to her cavernous transformation of portal vein and was significant for borderline low protein C and mild factor VII deficiency.  Patient had normal factor VII quantitation and normal INR 1.05 with no abnormalities of protein C activity.  -4/28/2023 PTT normal 27 lower limit of normal 25 upper limit of normal 35.  -5/15/2023 peripheral smear showed normal CBC and differential with a few reactive lymphocytes otherwise unremarkable morphology per hematopathology review at [facility]  -9/6/2023 hematology consult Dr. Dewey Ríos.  Being seen for concerns for coagulopathy.  Notes that he had undergone testing at [facility] a few years ago and was told he was a\" carrier for factor VII\".  This was undertaken after his daughter had hemostasis issues.  She now requires factor replacement prior to any procedures.  Patient had not had any bleeding issues including cardiac procedures with stent placement, lumbar back surgery, wisdom teeth removal.  In fact " [facility]  [address] [facility]  [address]  [place]  [address]  Tel: [phone]    May 21, 2018    Ann Marie D Linn  [address]  [place] [address]        Dear Ms. Foreman,    The following letter pertains to your most recent diagnostic tests:    Your MRI shows mild enlargement of the pituitary gland.  I don't think this is anything dangerous in the absence of new headaches or vision changes.  Please inform me if you experience those symptoms or any other new symptoms.  We can recheck pituitary gland hormone levels when you return for your next check up.  We will need to repeat the MRI in 12 months.       Sincerely,    Dr. Be / jose      Enclosure: Imaging Results    Resulted Orders   MR Brain w/o & w Contrast    Narrative    MRI OF THE BRAIN/PITUITARY WITHOUT AND WITH CONTRAST  5/18/2018 2:37  PM     COMPARISON: 9/22/2004.     HISTORY: Pituitary adenoma.    TECHNIQUE: Multiplanar multisequence MR images of the brain were  obtained with attention to the pituitary region. 9 mL Gadavist.    FINDINGS: Again seen is a nonenhancing T2 hypointense T1 slightly  hyperintense lesion along the anterior right aspect of the pituitary  gland that now measures 0.5 x 0.9 x 0.5 cm in AP, transverse, and  craniocaudal dimensions. This has increased in size since prior MR  9/22/2004 when it measured 0.5 x 0.7 x 0.3 cm. The pituitary  infundibulum remains midline. No mass effect on the optic chiasm. No  significant expansion of the sella. The cavernous carotid flow voids  appear within normal limits.     No evidence of intracranial mass, hemorrhage, or herniation.  Ventricular size within normal limits without evidence of  hydrocephalus. No abnormal extra axial fluid collection. Mild burden  of patchy deep and subcortical white matter T2 hyperintensities which  are nonspecific, but likely related to chronic microvascular ischemic  disease. No suspicious intracranial enhancement.      Impression     "he has early onset MI with congestive heart failure which requires antiplatelet therapy without significant signs or symptoms of bleeding.  Slightly low platelets on occasion.  Girlfriend noted that occasionally the CBC comments \"immature granulocytes\" noted on [facility] labs.  Dr. Ríos stated he would like to obtain his records [facility] but given the clinical history there does not appear to be any concern for risks of problems with hemostasis nor particular need for precautions.  Thought the periodic mild thrombocytopenia might be medication related.  -2/27/2024 white count 7000 hemoglobin 14.9 platelets 157,000 with unremarkable differential.    -4/15/2024 [facility] hematology consult: Patient comes to me for second opinion regarding his periodic epistaxis, mild thrombocytopenia that waxes and wanes with a purported history of being factor VII heterozygous deficient but with normal factor VII activity and normal PT and PTT.  This diagnosis was found on workup of his daughter who on whole genome sequencing was found to have this factor VII heterozygous deficiency coming from her father, my patient.  He himself has had neither significant bleedingHistory of back surgery, cardiac surgery and discogram with no bleeding nor any postoperative clotting from any of those.  The fact that he is having mild epistaxis periodically in the midst of winter on aspirin is not entirely mysterious but I would defer to ENT as to whether there could be polyps or other issues.  He does have seasonal allergies now compounding all of this.  People with heterozygous factor VII deficiency, unlike hemophilia A or hemophilia B, are not X-linked and generally only symptomatic when homozygous though there are reports of heterozygous patient's who have both bleeding and/or clotting tendencies.  In patients with factor VII activity over 35% such as his being in the over 60% range, risk of bleeding should be minimal and " IMPRESSION:  1. Slight increased size of the nonenhancing lesion in the anterior  right aspect of the pituitary gland. This may represent a microadenoma  or cyst. Recommend correlation with pituitary hormone levels.  2. No evidence of acute intracranial hemorrhage, mass, or herniation.  3. Mild nonspecific white matter changes likely due to chronic  microvascular ischemic disease.    JOSH JACQUES MD          generally we would not recommend factor replacement particularly when there is some thrombotic risk from this deficiency ironically albeit mild and I am not sure if that is what caused his daughters portal vein thrombosis or not as I have not seen her.  Should he have significant bleeding and there be thought about factor VII replacement which is not a common therapy given the rarity of this condition, I would suggest referral to the hemophilia clinic at  that actually is run by a nurse practitioner there who coordinates the care of their child and adolescent and then into adulthood hemophilia care.  At this junction I would continue his aspirin and in general principles it is better to risk a bleed to prevent further clot particularly in the precocious coronary disease such as this patient.  I will see on an as-needed basis.  As to comments on past slides of immature granulocytes, he has had peripheral smears that just basically showed reactive lymphocytes changes and he has a multiplicity of explanations for that and the odds in light of his multiplicity of essentially normal CBCs and differentials of there being some latent malignant leukemic process is essentially 0 given the longevity of this.  I will see on an as-needed basis.     Epistaxis, recurrent       HISTORY OF PRESENT ILLNESS:  The patient is a 33 y.o.  male, referred for evaluation of management of epistaxis with normal PTT, normal PTT, family history of portal vein thrombosis in a daughter who has simultaneous low protein C and protein S purportedly due to her liver disease but this patient having been through multiple traumas and surgeries throughout life without bleeding excessively nor post surgical thrombosis.  The daughter reportedly has factor VII heterozygous deficiency    REVIEW OF SYSTEMS:  No somatic complaints other than occasional nosebleeds but these are happening ever since he started the aspirin for his coronary disease    Past  Medical History:   Diagnosis Date    Anxiety and depression     Asthma     Asthma     Bipolar 2 disorder     Body piercing     tongue and lip    Coronary artery disease 04/28/2023    Heart attack 04/28/2023    Heart murmur     Hernia of testicle     History of concussion     middle school football injury    Hypertension     Low back pain     Lumbosacral disc disease     Migraines     Panic attack     Syncope and collapse 2008    Tattoos     Wears glasses     driving     Past Surgical History:   Procedure Laterality Date    DENTAL PROCEDURE      wisdom teeth, all 4 removed    HERNIA REPAIR      LUMBAR DISCECTOMY Right 11/11/2021    Procedure: right L3-4 extraforaminal discectomy;  Surgeon: Fredy Heart MD;  Location: [facility];  Service: Neurosurgery;  Laterality: Right;       Current Outpatient Medications on File Prior to Visit   Medication Sig Dispense Refill    Acetaminophen Extra Strength 500 MG tablet       albuterol (PROVENTIL) (2.5 MG/3ML) 0.083% nebulizer solution       Aspirin Low Dose 81 MG chewable tablet       benztropine (COGENTIN) 1 MG tablet Take 1 tablet by mouth 2 (Two) Times a Day.      buPROPion SR (WELLBUTRIN SR) 150 MG 12 hr tablet       cetirizine (zyrTEC) 10 MG tablet Take 1 tablet by mouth Daily.      empagliflozin (JARDIANCE) 10 MG tablet tablet Take 1 tablet by mouth Daily.      Flovent  MCG/ACT inhaler       fluticasone (FLONASE) 50 MCG/ACT nasal spray USE 1-2 SPRAYS PER NOSTRIL DAILY.      gabapentin (NEURONTIN) 300 MG capsule 1 tab PO daily 30 capsule 1    lisinopril (PRINIVIL,ZESTRIL) 20 MG tablet Take 1 tablet by mouth Daily.      losartan (COZAAR) 25 MG tablet Take 1 tablet by mouth Daily.      metoprolol succinate XL (TOPROL-XL) 25 MG 24 hr tablet Take 0.5 tablets by mouth Daily.      nitroglycerin (NITROSTAT) 0.4 MG SL tablet       omeprazole (priLOSEC) 20 MG capsule Take 1 capsule by mouth Daily.      ondansetron (ZOFRAN) 4 MG tablet Take 1 tablet by mouth Every 8  "(Eight) Hours As Needed for Nausea or Vomiting. 15 tablet 0    ondansetron ODT (ZOFRAN-ODT) 4 MG disintegrating tablet Place 1 tablet on the tongue Take As Directed. USE 4-8 MG TWICE DAILY AS NEEDED FOR NAUSEA WITH MIGRAINE.      prasugrel (EFFIENT) 10 MG tablet Take 1 tablet by mouth Daily.      ranolazine (RANEXA) 500 MG 12 hr tablet Take 1 tablet by mouth 2 (Two) Times a Day.      risperiDONE (risperDAL) 1 MG tablet Take 1 tablet by mouth 2 (Two) Times a Day.      rosuvastatin (CRESTOR) 40 MG tablet Take 1 tablet by mouth Daily.      spironolactone (ALDACTONE) 25 MG tablet Take 0.5 tablets by mouth Daily.      traZODone (DESYREL) 50 MG tablet       triamcinolone (KENALOG) 0.1 % cream       Ubrelvy tablet       valACYclovir (VALTREX) 500 MG tablet        No current facility-administered medications on file prior to visit.       No Known Allergies    Social History     Socioeconomic History    Marital status: Single   Tobacco Use    Smoking status: Every Day     Current packs/day: 0.00     Types: Cigarettes     Last attempt to quit: 2023     Years since quittin.0    Smokeless tobacco: Never    Tobacco comments:     0.25 or less per day   Vaping Use    Vaping status: Some Days    Substances: Nicotine, Flavoring    Devices: Disposable   Substance and Sexual Activity    Alcohol use: No    Drug use: No    Sexual activity: Yes     Partners: Female       Family History   Adopted: Yes       PHYSICAL EXAM:  No petechiae or ecchymoses.  No jaundice icterus or pallor.  No respiratory distress.    /80   Pulse 67   Temp 98.1 °F (36.7 °C)   Resp 16   Ht 177.8 cm (70\")   Wt 93 kg (205 lb)   SpO2 96%   BMI 29.41 kg/m²     ECOG score: 0     Lab Results   Component Value Date    HGB 13.5 (L) 2023    HCT 38.3 (L) 2023    MCV 86 2023     (L) 2023    WBC 7.47 2023    NEUTROABS 5.82 2023    LYMPHSABS 2.44 2023    MONOSABS 0.74 2023    EOSABS 0.15 2023    " "BASOSABS 0.05 05/29/2023     Lab Results   Component Value Date    GLUCOSE 92 12/15/2022    BUN 8 12/15/2022    CREATININE 0.80 12/15/2022     (L) 04/28/2023    K 3.3 (L) 04/28/2023    CL 98 04/28/2023    CO2 22 04/28/2023    CALCIUM 9.6 12/15/2022    PROTEINTOT 6.6 11/09/2021    ALBUMIN 4.10 11/09/2021    BILITOT 0.4 11/09/2021    ALKPHOS 101 11/09/2021    AST 22 11/09/2021    ALT 30 11/09/2021         Assessment & Plan   1. 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  NSTEMI on aspirin with CHF  3.  Occasional nosebleeds  4.  Sleep apnea  5.  Migraines  6.  Hypertension  7.  Hyperlipidemia    Hematology history timeline:  -5/7/2020 whole exome sequencing genetics at [facility]  -4/28/2021 factor VII quantitation normal 69.6 with range of normal %.  INR 1.05.  Normal protein C activity.  Telephone note from [facility] Dr. Jeffrey Ratliff states that the patient's daughter had hypercoagulable workup due to her cavernous transformation of portal vein and was significant for borderline low protein C and mild factor VII deficiency.  Patient had normal factor VII quantitation and normal INR 1.05 with no abnormalities of protein C activity.  -4/28/2023 PTT normal 27 lower limit of normal 25 upper limit of normal 35.  -5/15/2023 peripheral smear showed normal CBC and differential with a few reactive lymphocytes otherwise unremarkable morphology per hematopathology review at [facility]  -9/6/2023 hematology consult Dr. Dewey Ríos.  Being seen for concerns for coagulopathy.  Notes that he had undergone testing at [facility] a few years ago and was told he was a\" carrier for factor VII\".  This was undertaken after his daughter had hemostasis issues.  She now requires factor replacement prior to any procedures.  Patient had not had any bleeding issues including cardiac procedures with stent placement, lumbar back surgery, " "wisdom teeth removal.  In fact he has early onset MI with congestive heart failure which requires antiplatelet therapy without significant signs or symptoms of bleeding.  Slightly low platelets on occasion.  Girlfriend noted that occasionally the CBC comments \"immature granulocytes\" noted on [facility] labs.  Dr. Ríos stated he would like to obtain his records [facility] but given the clinical history there does not appear to be any concern for risks of problems with hemostasis nor particular need for precautions.  Thought the periodic mild thrombocytopenia might be medication related.  -2/27/2024 white count 7000 hemoglobin 14.9 platelets 157,000 with unremarkable differential.    -4/15/2024 [facility] hematology consult: Patient comes to me for second opinion regarding his periodic epistaxis, mild thrombocytopenia that waxes and wanes with a purported history of being factor VII heterozygous deficient but with normal factor VII activity and normal PT and PTT.  This diagnosis was found on workup of his daughter who on whole genome sequencing was found to have this factor VII heterozygous deficiency coming from her father, my patient.  He himself has had neither significant bleedingHistory of back surgery, cardiac surgery and discogram with no bleeding nor any postoperative clotting from any of those.  The fact that he is having mild epistaxis periodically in the midst of winter on aspirin is not entirely mysterious but I would defer to ENT as to whether there could be polyps or other issues.  He does have seasonal allergies now compounding all of this.  People with heterozygous factor VII deficiency, unlike hemophilia A or hemophilia B, are not X-linked and generally only symptomatic when homozygous though there are reports of heterozygous patient's who have both bleeding and/or clotting tendencies.  In patients with factor VII activity over 35% such as his being in the over 60% range, risk of " bleeding should be minimal and generally we would not recommend factor replacement particularly when there is some thrombotic risk from this deficiency ironically albeit mild and I am not sure if that is what caused his daughters portal vein thrombosis or not as I have not seen her.  Should he have significant bleeding and there be thought about factor VII replacement which is not a common therapy given the rarity of this condition, I would suggest referral to the hemophilia clinic at  that actually is run by a nurse practitioner there who coordinates the care of their child and adolescent and then into adulthood hemophilia care.  At this junction I would continue his aspirin and in general principles it is better to risk a bleed to prevent further clot particularly in the precocious coronary disease such as this patient.  I will see on an as-needed basis.  As to comments on past slides of immature granulocytes, he has had peripheral smears that just basically showed reactive lymphocytes changes and he has a multiplicity of explanations for that and the odds in light of his multiplicity of essentially normal CBCs and differentials of there being some latent malignant leukemic process is essentially 0 given the longevity of this.  I will see on an as-needed basis.    Total time of care today inclusive of time spent today prior to patient's arrival reviewing past records and trying to extract that information from outside records [facility] as well as  over the past 3 years and during visit interviewing him as to signs or symptoms of his disease and management thereof and after visit instituting the plan as outlined took 1 hour patient care time throughout the day today.      Billy Cabral MD    4/15/2024

## (undated) DEVICE — UNDERGLV SURG BIOGEL INDICAT PF 61/2 GRN

## (undated) DEVICE — SUT VIC PLS CTD ANTIB BR 3/0 8/18IN 45CM

## (undated) DEVICE — PATIENT RETURN ELECTRODE, SINGLE-USE, CONTACT QUALITY MONITORING, ADULT, WITH 9FT CORD, FOR PATIENTS WEIGING OVER 33LBS. (15KG): Brand: MEGADYNE

## (undated) DEVICE — ANTIBACTERIAL UNDYED BRAIDED (POLYGLACTIN 910), SYNTHETIC ABSORBABLE SUTURE: Brand: COATED VICRYL

## (undated) DEVICE — CABL BIPOL MEGADYNE 12FT DISP

## (undated) DEVICE — GLV SURG PREMIERPRO MIC LTX PF SZ6.5 BRN

## (undated) DEVICE — GLV SURG PREMIERPRO MIC LTX PF SZ7.5 BRN

## (undated) DEVICE — PK NEURO DISC 10

## (undated) DEVICE — DRSNG WND BORDR/ADHS NONADHR/GZ LF 4X4IN STRL

## (undated) DEVICE — BLANKT WARM UPPR/BDY ARM/OUT 57X196CM

## (undated) DEVICE — ELECTRD BLD EZ CLN MOD 4IN

## (undated) DEVICE — TOOL 14MH30D LEGEND 14CM 3MM MH DIAM: Brand: MIDAS REX ™

## (undated) DEVICE — ELECTRD BLD EZ CLN STD 2.5IN

## (undated) DEVICE — STRAP POSTN KN/BDY FM 5X72IN DISP

## (undated) DEVICE — C-ARM DRAPE: Brand: DEROYAL

## (undated) DEVICE — HDRST INTUB GENTLETOUCH SLOT 7IN RT

## (undated) DEVICE — ADHS SKIN PREMIERPRO EXOFIN TOPICAL HI/VISC .5ML

## (undated) DEVICE — ACCY PA700 LUBRICANT DIFFUSER MR7 4 PACK: Brand: MIDAS REX

## (undated) DEVICE — PENCL ROCKRSWCH MEGADYNE W/HOLSTR 10FT SS